# Patient Record
Sex: FEMALE | Race: WHITE
[De-identification: names, ages, dates, MRNs, and addresses within clinical notes are randomized per-mention and may not be internally consistent; named-entity substitution may affect disease eponyms.]

---

## 2020-01-30 ENCOUNTER — HOSPITAL ENCOUNTER (OUTPATIENT)
Dept: HOSPITAL 9 - MADCT | Age: 70
Discharge: HOME | End: 2020-01-30
Attending: FAMILY MEDICINE
Payer: MEDICARE

## 2020-01-30 DIAGNOSIS — G93.89: ICD-10-CM

## 2020-01-30 DIAGNOSIS — R22.0: Primary | ICD-10-CM

## 2020-01-30 DIAGNOSIS — L98.8: ICD-10-CM

## 2020-01-30 PROCEDURE — 70450 CT HEAD/BRAIN W/O DYE: CPT

## 2020-01-30 NOTE — CT
CT HEAD WITHOUT CONTRAST:

 

INDICATION: 

Bump on head. 

 

FINDINGS: 

There is a mass involving the scalp and subcutaneous tissues right posterior temporal region.  This m
ass measures approximately 4.0 AP dimension x approximately 1.0 cm width to the skull cortex.  This m
ass does involve the bony calvarium and is producing erosive changes in the skull at this location.  
There is also dural thickening at this site indicating dural involvement.  This is consistent with a 
neoplastic process.

 

There are 2 high-density masses involving the left cerebral hemisphere.  The larger measures 2.5 cm A
P dimension and is seen in the left posterior parietal lobe.  There is mild surrounding edema.  A sec
ond lesion measures 0.7 cm AP dimension and is located in the superior cortex of the left occipital l
obe.  Mild edema surrounds this lesion.  

 

Both these lesions are high-density on this noncontrast study.  This could indicate hemorrhagic metas
tasis or it could represent melanoma metastasis.

 

IMPRESSION: 

An aggressive scalp lesion in the right posterior temporoparietal region which produces skull erosion
 and dural involvement as described above.  There are at least 2 parenchymal lesions seen on the left
 as described above consistent with metastatic lesions.  Recommend further evaluation with pre- and p
ostcontrast MRI.  Findings were discussed with Dr. Taylor.

 

 

CODE CR

 

POS: AFSANEH

## 2020-02-01 ENCOUNTER — HOSPITAL ENCOUNTER (INPATIENT)
Dept: HOSPITAL 92 - ERS | Age: 70
LOS: 10 days | DRG: 871 | End: 2020-02-11
Attending: INTERNAL MEDICINE | Admitting: FAMILY MEDICINE
Payer: MEDICARE

## 2020-02-01 VITALS — BODY MASS INDEX: 21.5 KG/M2

## 2020-02-01 DIAGNOSIS — Z88.8: ICD-10-CM

## 2020-02-01 DIAGNOSIS — I50.23: ICD-10-CM

## 2020-02-01 DIAGNOSIS — J69.0: ICD-10-CM

## 2020-02-01 DIAGNOSIS — Z66: ICD-10-CM

## 2020-02-01 DIAGNOSIS — F10.10: ICD-10-CM

## 2020-02-01 DIAGNOSIS — E87.6: ICD-10-CM

## 2020-02-01 DIAGNOSIS — Z90.710: ICD-10-CM

## 2020-02-01 DIAGNOSIS — E87.5: ICD-10-CM

## 2020-02-01 DIAGNOSIS — F41.9: ICD-10-CM

## 2020-02-01 DIAGNOSIS — M40.209: ICD-10-CM

## 2020-02-01 DIAGNOSIS — R29.6: ICD-10-CM

## 2020-02-01 DIAGNOSIS — C79.31: ICD-10-CM

## 2020-02-01 DIAGNOSIS — J44.1: ICD-10-CM

## 2020-02-01 DIAGNOSIS — Z88.0: ICD-10-CM

## 2020-02-01 DIAGNOSIS — N39.0: ICD-10-CM

## 2020-02-01 DIAGNOSIS — D63.8: ICD-10-CM

## 2020-02-01 DIAGNOSIS — H40.9: ICD-10-CM

## 2020-02-01 DIAGNOSIS — E22.2: ICD-10-CM

## 2020-02-01 DIAGNOSIS — C79.70: ICD-10-CM

## 2020-02-01 DIAGNOSIS — R59.1: ICD-10-CM

## 2020-02-01 DIAGNOSIS — C34.11: ICD-10-CM

## 2020-02-01 DIAGNOSIS — E88.3: ICD-10-CM

## 2020-02-01 DIAGNOSIS — D69.6: ICD-10-CM

## 2020-02-01 DIAGNOSIS — Z51.5: ICD-10-CM

## 2020-02-01 DIAGNOSIS — D75.89: ICD-10-CM

## 2020-02-01 DIAGNOSIS — A41.9: Primary | ICD-10-CM

## 2020-02-01 DIAGNOSIS — E46: ICD-10-CM

## 2020-02-01 DIAGNOSIS — J96.21: ICD-10-CM

## 2020-02-01 DIAGNOSIS — C78.7: ICD-10-CM

## 2020-02-01 DIAGNOSIS — R62.7: ICD-10-CM

## 2020-02-01 DIAGNOSIS — S72.141A: ICD-10-CM

## 2020-02-01 LAB
ALBUMIN SERPL BCG-MCNC: 3.7 G/DL (ref 3.4–4.8)
ALP SERPL-CCNC: 96 U/L (ref 40–110)
ALT SERPL W P-5'-P-CCNC: 45 U/L (ref 8–55)
ANION GAP SERPL CALC-SCNC: 15 MMOL/L (ref 10–20)
ANION GAP SERPL CALC-SCNC: 16 MMOL/L (ref 10–20)
APTT PPP: 28.5 SEC (ref 22.9–36.1)
AST SERPL-CCNC: 39 U/L (ref 5–34)
BACTERIA UR QL AUTO: (no result) HPF
BASOPHILS # BLD AUTO: 0 THOU/UL (ref 0–0.2)
BASOPHILS NFR BLD AUTO: 0.1 % (ref 0–1)
BILIRUB SERPL-MCNC: 0.9 MG/DL (ref 0.2–1.2)
BUN SERPL-MCNC: 8 MG/DL (ref 9.8–20.1)
BUN SERPL-MCNC: 9 MG/DL (ref 9.8–20.1)
CALCIUM SERPL-MCNC: 7.9 MG/DL (ref 7.8–10.44)
CALCIUM SERPL-MCNC: 8.4 MG/DL (ref 7.8–10.44)
CHLORIDE SERPL-SCNC: 70 MMOL/L (ref 98–107)
CHLORIDE SERPL-SCNC: 74 MMOL/L (ref 98–107)
CK SERPL-CCNC: 136 U/L (ref 29–168)
CO2 SERPL-SCNC: 26 MMOL/L (ref 23–31)
CO2 SERPL-SCNC: 28 MMOL/L (ref 23–31)
CREAT CL PREDICTED SERPL C-G-VRATE: 0 ML/MIN (ref 70–130)
CREAT CL PREDICTED SERPL C-G-VRATE: 0 ML/MIN (ref 70–130)
D DIMER PPP FEU-MCNC: 4.25 *MCG/ML (ref 0.27–0.43)
EOSINOPHIL # BLD AUTO: 0 THOU/UL (ref 0–0.7)
EOSINOPHIL NFR BLD AUTO: 0.2 % (ref 0–10)
GLOBULIN SER CALC-MCNC: 1.9 G/DL (ref 2.4–3.5)
GLUCOSE SERPL-MCNC: 112 MG/DL (ref 80–115)
GLUCOSE SERPL-MCNC: 124 MG/DL (ref 80–115)
HGB BLD-MCNC: 13 G/DL (ref 12–16)
INR PPP: 1.2
LEUKOCYTE ESTERASE UR QL STRIP.AUTO: 75 LEU/UL
LIPASE SERPL-CCNC: 27 U/L (ref 8–78)
LYMPHOCYTES # BLD: 0.8 THOU/UL (ref 1.2–3.4)
LYMPHOCYTES NFR BLD AUTO: 5.9 % (ref 21–51)
MAGNESIUM SERPL-MCNC: 1.7 MG/DL (ref 1.6–2.6)
MCH RBC QN AUTO: 35.4 PG (ref 27–31)
MCV RBC AUTO: 98.3 FL (ref 78–98)
MONOCYTES # BLD AUTO: 0.8 THOU/UL (ref 0.11–0.59)
MONOCYTES NFR BLD AUTO: 5.9 % (ref 0–10)
NEUTROPHILS # BLD AUTO: 12 THOU/UL (ref 1.4–6.5)
NEUTROPHILS NFR BLD AUTO: 87.9 % (ref 42–75)
PLATELET # BLD AUTO: 277 THOU/UL (ref 130–400)
POTASSIUM SERPL-SCNC: 3 MMOL/L (ref 3.5–5.1)
POTASSIUM SERPL-SCNC: 3.1 MMOL/L (ref 3.5–5.1)
PROT UR STRIP.AUTO-MCNC: 20 MG/DL
PROTHROMBIN TIME: 15.1 SEC (ref 12–14.7)
RBC # BLD AUTO: 3.67 MILL/UL (ref 4.2–5.4)
RBC UR QL AUTO: (no result) HPF (ref 0–3)
SODIUM SERPL-SCNC: 111 MMOL/L (ref 136–145)
SODIUM SERPL-SCNC: 112 MMOL/L (ref 136–145)
WBC # BLD AUTO: 13.6 THOU/UL (ref 4.8–10.8)

## 2020-02-01 PROCEDURE — 84550 ASSAY OF BLOOD/URIC ACID: CPT

## 2020-02-01 PROCEDURE — 87804 INFLUENZA ASSAY W/OPTIC: CPT

## 2020-02-01 PROCEDURE — 85730 THROMBOPLASTIN TIME PARTIAL: CPT

## 2020-02-01 PROCEDURE — 88173 CYTOPATH EVAL FNA REPORT: CPT

## 2020-02-01 PROCEDURE — 82533 TOTAL CORTISOL: CPT

## 2020-02-01 PROCEDURE — 74175 CTA ABDOMEN W/CONTRAST: CPT

## 2020-02-01 PROCEDURE — 94660 CPAP INITIATION&MGMT: CPT

## 2020-02-01 PROCEDURE — 96361 HYDRATE IV INFUSION ADD-ON: CPT

## 2020-02-01 PROCEDURE — 88342 IMHCHEM/IMCYTCHM 1ST ANTB: CPT

## 2020-02-01 PROCEDURE — 83880 ASSAY OF NATRIURETIC PEPTIDE: CPT

## 2020-02-01 PROCEDURE — 94640 AIRWAY INHALATION TREATMENT: CPT

## 2020-02-01 PROCEDURE — 87040 BLOOD CULTURE FOR BACTERIA: CPT

## 2020-02-01 PROCEDURE — 93005 ELECTROCARDIOGRAM TRACING: CPT

## 2020-02-01 PROCEDURE — 82607 VITAMIN B-12: CPT

## 2020-02-01 PROCEDURE — 85379 FIBRIN DEGRADATION QUANT: CPT

## 2020-02-01 PROCEDURE — 82805 BLOOD GASES W/O2 SATURATION: CPT

## 2020-02-01 PROCEDURE — 80053 COMPREHEN METABOLIC PANEL: CPT

## 2020-02-01 PROCEDURE — 84100 ASSAY OF PHOSPHORUS: CPT

## 2020-02-01 PROCEDURE — 51702 INSERT TEMP BLADDER CATH: CPT

## 2020-02-01 PROCEDURE — 86850 RBC ANTIBODY SCREEN: CPT

## 2020-02-01 PROCEDURE — 5A09557 ASSISTANCE WITH RESPIRATORY VENTILATION, GREATER THAN 96 CONSECUTIVE HOURS, CONTINUOUS POSITIVE AIRWAY PRESSURE: ICD-10-PCS | Performed by: PHYSICIAN ASSISTANT

## 2020-02-01 PROCEDURE — 88360 TUMOR IMMUNOHISTOCHEM/MANUAL: CPT

## 2020-02-01 PROCEDURE — 83735 ASSAY OF MAGNESIUM: CPT

## 2020-02-01 PROCEDURE — 72170 X-RAY EXAM OF PELVIS: CPT

## 2020-02-01 PROCEDURE — 87086 URINE CULTURE/COLONY COUNT: CPT

## 2020-02-01 PROCEDURE — 84484 ASSAY OF TROPONIN QUANT: CPT

## 2020-02-01 PROCEDURE — 85610 PROTHROMBIN TIME: CPT

## 2020-02-01 PROCEDURE — 70450 CT HEAD/BRAIN W/O DYE: CPT

## 2020-02-01 PROCEDURE — P9047 ALBUMIN (HUMAN), 25%, 50ML: HCPCS

## 2020-02-01 PROCEDURE — 83935 ASSAY OF URINE OSMOLALITY: CPT

## 2020-02-01 PROCEDURE — 80048 BASIC METABOLIC PNL TOTAL CA: CPT

## 2020-02-01 PROCEDURE — 36415 COLL VENOUS BLD VENIPUNCTURE: CPT

## 2020-02-01 PROCEDURE — 82550 ASSAY OF CK (CPK): CPT

## 2020-02-01 PROCEDURE — 86901 BLOOD TYPING SEROLOGIC RH(D): CPT

## 2020-02-01 PROCEDURE — 71275 CT ANGIOGRAPHY CHEST: CPT

## 2020-02-01 PROCEDURE — 88172 CYTP DX EVAL FNA 1ST EA SITE: CPT

## 2020-02-01 PROCEDURE — 80069 RENAL FUNCTION PANEL: CPT

## 2020-02-01 PROCEDURE — 84443 ASSAY THYROID STIM HORMONE: CPT

## 2020-02-01 PROCEDURE — 96365 THER/PROPH/DIAG IV INF INIT: CPT

## 2020-02-01 PROCEDURE — 86900 BLOOD TYPING SEROLOGIC ABO: CPT

## 2020-02-01 PROCEDURE — 82746 ASSAY OF FOLIC ACID SERUM: CPT

## 2020-02-01 PROCEDURE — 85025 COMPLETE CBC W/AUTO DIFF WBC: CPT

## 2020-02-01 PROCEDURE — 96376 TX/PRO/DX INJ SAME DRUG ADON: CPT

## 2020-02-01 PROCEDURE — 83930 ASSAY OF BLOOD OSMOLALITY: CPT

## 2020-02-01 PROCEDURE — 83605 ASSAY OF LACTIC ACID: CPT

## 2020-02-01 PROCEDURE — 72191 CT ANGIOGRAPH PELV W/O&W/DYE: CPT

## 2020-02-01 PROCEDURE — 83690 ASSAY OF LIPASE: CPT

## 2020-02-01 PROCEDURE — 88341 IMHCHEM/IMCYTCHM EA ADD ANTB: CPT

## 2020-02-01 PROCEDURE — 81003 URINALYSIS AUTO W/O SCOPE: CPT

## 2020-02-01 PROCEDURE — 81015 MICROSCOPIC EXAM OF URINE: CPT

## 2020-02-01 PROCEDURE — 93970 EXTREMITY STUDY: CPT

## 2020-02-01 PROCEDURE — 71045 X-RAY EXAM CHEST 1 VIEW: CPT

## 2020-02-01 PROCEDURE — 88305 TISSUE EXAM BY PATHOLOGIST: CPT

## 2020-02-01 PROCEDURE — 88177 CYTP FNA EVAL EA ADDL: CPT

## 2020-02-01 PROCEDURE — 96375 TX/PRO/DX INJ NEW DRUG ADDON: CPT

## 2020-02-01 NOTE — RAD
Chest AP view



INDICATION: History of syncope



COMPARISON: None



FINDINGS:



Lungs:There is a 5.6 cm right suprahilar mass which may reflect aneurysmal dilatation of the ascendin
g aorta or possibly prominent lymphadenopathy or a hilar malignancy. There is some moderate COPD

change.



Cardiac silhouette:There is mild cardiomegaly. There are vascular calcifications of the aortic arch.



Pulmonary vasculature:Normal



Pleural spaces:No pleural effusion or pneumothorax is demonstrated.



Upper abdomen:No abnormality seen.



Osseous structures: No acute osseous abnormality. There is healed fracture deformity involving the pr
oximal left humerus. There is diffuse osteopenia. There is scattered degenerative and

osteoarthritic change present.



Additional findings:None.



IMPRESSION: Right suprahilar mass possibly related to an aneurysm, lymphadenopathy or hilar malignanc
y. Recommend CT of the thorax with IV contrast for additional characterization. Moderate COPD

change, mild cardiomegaly and healed deformity of the proximal left humerus.



Reported By: Chandu Dutton 

Electronically Signed:  2/1/2020 6:28 PM

## 2020-02-01 NOTE — CT
CT Brain WO Con:



2/1/2020 6:16 PM



CLINICAL HISTORY: Frequent falls.



IMAGING TECHNIQUE: Multiple CT images were obtained of the brain without IV contrast.



COMPARISON: CT the brain without contrast dated genera 30th 2020



FINDINGS:

Brain:  Large left parietal lobe metastatic lesion measuring 2.4 cm a stable appearing. There is a 7 
mm left occipital lesion which is similar appearing. The osteolytic lesion involving the right

parietal skull with associated scalp soft tissue mass is stable. No midline shift is evident. No acut
e intracranial hemorrhage or hydrocephalus is present.

Ventricles: Normal.  No hydrocephalus.

Skull: As above

Visualized Paranasal sinuses: Clear.

Mastoid air cells:Clear.

Extracranial soft tissues:Normal.



IMPRESSION:



Stable intracranial metastatic disease and right parietal skull metastatic lesion.

No acute interval intracranial abnormality demonstrated.



Reported By: Chandu Dutton 

Electronically Signed:  2/1/2020 7:05 PM

## 2020-02-01 NOTE — CT
CTA OF THE CHEST AND ABDOMEN UTILIZING AN AORTIC DISSECTION PROTOCOL AND 3-D REFORMATTED IMAGING



INDICATION: 69-year-old female with possible aneurysm or hilar mass



COMPARISON: Chest radiograph dated February 1, 2020



FINDINGS:



Aorta: No acute aortic stenosis, occlusion or aneurysmal formation demonstrated. There are mild vascu
lar calcifications seen involving the visualized vasculature.



Central pulmonary artery: No central pulmonary embolus demonstrated.



Additional thorax findings: There is a large 7.8 x 6.4 cm mass involving the right suprahilar region 
with invasion into the right upper lobe bronchus with circumferential narrowing involving the right

mainstem bronchus and SVC. There is suspected mucous debris seen at the level of the hossein. There is
 an enlarged right supraclavicular lymph node measuring 1.2 cm. There are pleural-based metastatic

lesions in the right hemithorax. When the largest seen within the posterior right hemithorax measures
 3 4 4 cm. An additional lesion is seen within posterior right hemithorax measuring 1.5 cm. There

is an enlarged anterior mediastinal lymph node measuring 3.7 x 2.2 cm. There is a small right pleural
 effusion.



Additional abdominal findings: Multiple hepatic metastatic lesions. The largest is seen within left h
epatic lobe measuring 5.1 cm. There is a 1.5 cm right adrenal metastatic lesion. There is a 1.1 cm

left adrenal metastatic lesion. There are enlarged lymph nodes within the peripancreatic and portacav
al regions consistent with malignant lymphadenopathy. When the largest measures 1.8 cm.

Osseous structures: There are age-indeterminate compression abnormalities involving T3, T4, T6-L4. Th
ere is healed fracture deformity involving the distal sternal body. There is diffuse osteopenia.

There is scattered degenerative and osteoarthritic change present.



IMPRESSION:

1. Large right suprahilar mass invading the right aspect of the mediastinum extending up into the sup
erior right paratracheal region and surrounding the SVC. The lesion also invades the right upper

lobe bronchus completely obstructing the right upper lobe bronchus. There is circumferential involvem
ent of the lesion with a right mainstem bronchus. There is malignant lymphadenopathy within the

right subclavicular region and anterior mediastinum. There is metastatic lesions within the right ple
ural space with a small right pleural effusion. There are multiple hepatic metastatic lesions and

bilateral adrenal metastatic lesions. There is malignant lymphadenopathy of the upper abdomen.

2. No acute aortic stenosis, occlusion or aneurysmal formation.

3. Numerous age-indeterminate thoracolumbar spinal compression abnormalities.



Reported By: Chandu Dutton 

Electronically Signed:  2/1/2020 7:02 PM

## 2020-02-01 NOTE — RAD
AP view of the pelvis



INDICATION: Fall



COMPARISON: None.



FINDINGS:



Bones: There is a comminuted right hip intertrochanteric fracture. There is diffuse osteopenia. No ad
ditional fracture is evident.



Hips: There is mild osteoarthrosis of both hips. 



SI joints and symphysis pubis: Normal appearing.



Intrapelvic contents: Within normal limits.



IMPRESSION: Comminuted, angulated right hip intertrochanteric fracture. 



Reported By: Chandu Dutton 

Electronically Signed:  2/1/2020 6:31 PM

## 2020-02-01 NOTE — RAD
XR Hip Rt 2-3 View



INDICATION: Fall with right hip pain



COMPARISON: None



FINDINGS:



Bones: There is a varus angulated, moderately displaced right hip intertrochanteric fracture. The fra
cture is obliquely oriented and extends through the right inferior intertrochanteric region and

exits just proximal to the origin of the lesser trochanter. The proximal fracture fragment is abducte
d likely from the gluteal musculature pulling the fragment laterally and superiorly. There is

diffuse osteopenia. No additional fractures evident.



Hip joint: There is mild right hip osteoarthrosis.



SI joints and symphysis pubis: Radiographically normal.



Intrapelvic contents: Visualized bowel gas pattern is within normal limits.



Surrounding soft tissues: Radiographically normal.



IMPRESSION:

1. Moderately displaced right hip intertrochanteric fracture.



Reported By: Chandu Dutton 

Electronically Signed:  2/1/2020 6:30 PM

## 2020-02-01 NOTE — HP
PRIMARY CARE PHYSICIAN:  Dr. Taylor.



CHIEF COMPLAINT:  Fall.



HISTORY OF PRESENT ILLNESS:  The patient is a 69-year-old female, who presented 
to

the emergency room with above complaints.  Please note, the patient is 
somnolent and

not much information is available from the patient.  History was obtained from 
the

ER record.  No family at the bedside. 



The patient has been feeling generally weak over the past few days.  She had 2

episodes of fall in the last 2 days.  She reports that she has a history of 
brain

cancer and has an appointment to see Dr. Toussaint next week.  No fever or 
chills

reported.  She denies any other complaints.  Again, she is somnolent and not 
much

information is available from the patient. 



PAST MEDICAL HISTORY:  

1. Glaucoma.

2. Questionable brain cancer.



PAST SURGICAL HISTORY:  

1. Hysterectomy.

2. Glaucoma.

3. Tonsillectomy.



ALLERGIES:  THE PATIENT IS ALLERGIC TO CODEINE AND PENICILLIN.



CURRENT HOME MEDICATIONS:  The patient states that she takes eyedrops for 
glaucoma

and other p.r.n. over-the-counter medications.  Per ER report, her  is 
giving

half of her water pill. 



SOCIAL HISTORY:  The patient currently lives at home with her .  She 
does not

use a cane or walker to ambulate.  She denies any smoking or alcohol. 



FAMILY HISTORY:  Negative for heart disease.



REVIEW OF SYSTEMS:  Cannot be reliably obtained from the patient due to current

cognitive status. 



PHYSICAL EXAMINATION:

VITAL SIGNS:  Temperature 98.1, respirations 22, pulse rate of 99, blood 
pressure of

123/77 with O2 saturations of 92% on room air. 

GENERAL:  A 69-year-old female with altered mentation. 

HEENT:  Head, atraumatic and normocephalic.  Sclerae anicteric.  Dry mucous

membranes.  No oral lesion. 

NECK:  Supple.  No JVD appreciated.  No carotid bruit. 

LUNGS:  Showed diminished air entry at bilateral bases without any rales or 
rhonchi. 

HEART:  S1, S2 present.  Regular rate and rhythm.  No rubs or gallops. 

ABDOMEN:  Soft, nontender.  Bowel sounds present. 

EXTREMITIES:  There is 3+ edema in bilateral lower extremity.  The right leg is

shortened and externally rotated. 

NEUROLOGIC:  The patient is somnolent; however, opens eyes and follows commands 
to

some extent.  There is no overall rigidity.  There is generalized weakness.

Examination was essentially nonfocal. 

PSYCHIATRIC:  As discussed above. 

SKIN:  Warm and dry. 

LYMPH NODES:  No palpable lymph nodes in the neck. 

PERIPHERAL VASCULAR:  Radial pulses palpable bilaterally. 

MUSCULOSKELETAL:  No joint swelling tenderness except for swelling at the right 
hip.



LABORATORY FINDINGS:  WBC 13.6 with hemoglobin 13, hematocrit 36, platelet 
count of

277. 



PT 15.1, INR 1.2, PTT 28.2.  D-dimer was 4.25. 



Chemistry showed sodium 111, potassium 3.1, chloride of 70, bicarb 28, BUN 9,

creatinine 0.49, glucose of 124. 



Lactic acid 1.1.  Serum osmolality 231.  Troponin was negative.  BNP was 28.2.

Total bilirubin 0.9 with AST of 39. 



Urine osmolality was 425.  Urine specific gravity was 1.05 with wbc of 11 to 20 
and

3+ bacteria. 



Influenza screen was negative. 



Urine culture and blood cultures have been sent. 



Telemetry monitoring by my review showed sinus rhythm. 



CT aortic dissection protocol showed a large 7.8 x 6.4 cm mass involving the 
right

suprahilar region with invasion into the right upper lobe bronchus with

circumferential narrowing involving the right mainstem bronchus and superior 
vena

cava.  There was also multiple hepatic metastatic lesion as well as bilateral

adrenal metastatic lesion with malignant lymphadenopathy in the upper abdomen. 



Chest x-ray by my review showed right suprahilar mass. 



Right hip x-ray showed moderately displaced right hip intertrochanteric 
fracture. 



CT scan of the brain without contrast showed stable intracranial metastatic 
disease

and right parietal skull metastatic lesion. 



IMPRESSION:  

1. Generalized weakness with frequent falls, multifactorial.

2. Severe hypotonic hyponatremia, suspected due to syndrome of inappropriate

antidiuretic hormone secretion. 

3. Large right suprahilar mass with metastasis involving the liver, adrenal 
gland,

as well as brain. 

4. Moderately displaced right hip intertrochanteric fracture.

5. Sepsis due to urinary tract infection.

6. Hypokalemia.

7. Macrocytosis.



PLAN:  The patient will be monitored in the intermediate care unit.  Nephrology,

Orthopedic Service, Trauma Service, and Oncology Service will be consulted.  We 
will

replace potassium.  We will hold IV fluids due to SIADH.  I discussed with Dr. Jean,

who recommended to check electrolytes directly in a.m.  We will also add empiric

antibiotics for UTI.  She is allergic to penicillin.  She is currently on 
Levaquin.

Magnesium was 1.7 with phosphorus of 3.0.  We will administer 2 g magnesium.  We

will hold Physical therapy and Occupational therapy for now due to hip 
fracture. 



The patient understands the above plan of care.







Job ID:  [id number]



MTDD

## 2020-02-02 LAB
ALBUMIN SERPL BCG-MCNC: 3.9 G/DL (ref 3.4–4.8)
ALBUMIN SERPL BCG-MCNC: 3.9 G/DL (ref 3.4–4.8)
ANALYZER IN CARDIO: (no result)
ANION GAP SERPL CALC-SCNC: 12 MMOL/L (ref 10–20)
ANION GAP SERPL CALC-SCNC: 13 MMOL/L (ref 10–20)
ANION GAP SERPL CALC-SCNC: 15 MMOL/L (ref 10–20)
BASE EXCESS STD BLDA CALC-SCNC: 4.5 MEQ/L
BASOPHILS # BLD AUTO: 0.1 THOU/UL (ref 0–0.2)
BASOPHILS NFR BLD AUTO: 0.5 % (ref 0–1)
BUN SERPL-MCNC: 5 MG/DL (ref 9.8–20.1)
BUN SERPL-MCNC: 6 MG/DL (ref 9.8–20.1)
BUN SERPL-MCNC: 7 MG/DL (ref 9.8–20.1)
BUN/CREAT SERPL: 13.64
BUN/CREAT SERPL: 14.89
CA-I BLDA-SCNC: 1.12 MMOL/L (ref 1.12–1.3)
CALCIUM SERPL-MCNC: 8 MG/DL (ref 7.8–10.44)
CALCIUM SERPL-MCNC: 8.1 MG/DL (ref 7.8–10.44)
CALCIUM SERPL-MCNC: 8.3 MG/DL (ref 7.8–10.44)
CHLORIDE SERPL-SCNC: 75 MMOL/L (ref 98–107)
CHLORIDE SERPL-SCNC: 76 MMOL/L (ref 98–107)
CHLORIDE SERPL-SCNC: 79 MMOL/L (ref 98–107)
CO2 SERPL-SCNC: 27 MMOL/L (ref 23–31)
CO2 SERPL-SCNC: 30 MMOL/L (ref 23–31)
CO2 SERPL-SCNC: 32 MMOL/L (ref 23–31)
CREAT CL PREDICTED SERPL C-G-VRATE: 88 ML/MIN (ref 70–130)
CREAT CL PREDICTED SERPL C-G-VRATE: 92 ML/MIN (ref 70–130)
CREAT CL PREDICTED SERPL C-G-VRATE: 92 ML/MIN (ref 70–130)
EOSINOPHIL # BLD AUTO: 0 THOU/UL (ref 0–0.7)
EOSINOPHIL NFR BLD AUTO: 0.1 % (ref 0–10)
GLUCOSE SERPL-MCNC: 112 MG/DL (ref 80–115)
GLUCOSE SERPL-MCNC: 127 MG/DL (ref 80–115)
GLUCOSE SERPL-MCNC: 139 MG/DL (ref 80–115)
HCO3 BLDA-SCNC: 30.9 MEQ/L (ref 22–28)
HCT VFR BLDA CALC: 33 % (ref 36–47)
HGB BLD-MCNC: 10 G/DL (ref 12–16)
HGB BLD-MCNC: 10.6 G/DL (ref 12–16)
HGB BLD-MCNC: 11.2 G/DL (ref 12–16)
HGB BLDA-MCNC: 11.1 G/DL (ref 12–16)
LYMPHOCYTES # BLD: 0.5 THOU/UL (ref 1.2–3.4)
LYMPHOCYTES NFR BLD AUTO: 4.1 % (ref 21–51)
MCH RBC QN AUTO: 34.7 PG (ref 27–31)
MCV RBC AUTO: 98.6 FL (ref 78–98)
MONOCYTES # BLD AUTO: 0.8 THOU/UL (ref 0.11–0.59)
MONOCYTES NFR BLD AUTO: 6.5 % (ref 0–10)
NEUTROPHILS # BLD AUTO: 11.2 THOU/UL (ref 1.4–6.5)
NEUTROPHILS NFR BLD AUTO: 88.8 % (ref 42–75)
PCO2 BLDA: 55 MMHG (ref 35–45)
PH BLDA: 7.37 [PH] (ref 7.35–7.45)
PLATELET # BLD AUTO: 230 THOU/UL (ref 130–400)
PO2 BLDA: 45.2 MMHG (ref 80–?)
POTASSIUM BLD-SCNC: 3.44 MMOL/L (ref 3.7–5.3)
POTASSIUM SERPL-SCNC: 3.4 MMOL/L (ref 3.5–5.1)
POTASSIUM SERPL-SCNC: 3.6 MMOL/L (ref 3.5–5.1)
POTASSIUM SERPL-SCNC: 3.7 MMOL/L (ref 3.5–5.1)
RBC # BLD AUTO: 2.88 MILL/UL (ref 4.2–5.4)
SODIUM SERPL-SCNC: 114 MMOL/L (ref 136–145)
SODIUM SERPL-SCNC: 114 MMOL/L (ref 136–145)
SODIUM SERPL-SCNC: 120 MMOL/L (ref 136–145)
SPECIMEN DRAWN FROM PATIENT: (no result)
WBC # BLD AUTO: 12.6 THOU/UL (ref 4.8–10.8)

## 2020-02-02 RX ADMIN — METRONIDAZOLE SCH MLS: 500 INJECTION, SOLUTION INTRAVENOUS at 19:50

## 2020-02-02 NOTE — PDOC.HOSPP
- Subjective


Encounter Date: 02/02/20


Encounter Time: 12:00


Subjective: 





awake, responds to questions, is lethargic.


moves all extremities except right LE, has pain.


sob+





- Objective


Vital Signs & Weight: 


 Vital Signs (12 hours)











  Temp Pulse Resp Pulse Ox


 


 02/02/20 15:55  97.6 F   


 


 02/02/20 15:10   97  


 


 02/02/20 12:45   97  32 H 


 


 02/02/20 12:00     92 L


 


 02/02/20 11:31  97.6 F   


 


 02/02/20 07:39     98


 


 02/02/20 07:38  97.1 F L   


 


 02/02/20 07:31     98








 Weight











Weight                         106 lb 8 oz











 Most Recent Monitor Data











Heart Rate from ECG            92


 


NIBP                           108/68


 


NIBP BP-Mean                   81


 


Respiration from ECG           15


 


SpO2                           93














I&O: 


 











 02/01/20 02/02/20 02/03/20





 06:59 06:59 06:59


 


Intake Total  1250 


 


Output Total  950 


 


Balance  300 











Result Diagrams: 


 02/02/20 12:51





 02/02/20 12:51





Hospitalist ROS





- Medication


Medications: 


Active Medications











Generic Name Dose Route Start Last Admin





  Trade Name Freq  PRN Reason Stop Dose Admin


 


Albuterol/Ipratropium  3 ml  02/02/20 13:00  02/02/20 13:54





  Duoneb  NEB   Not Given





  H5NT-VO TI   





     





     





     





     


 


Famotidine  20 mg  02/02/20 09:00  02/02/20 08:08





  Pepcid  PO   20 mg





  BID TI   Administration





     





     





     





     


 


Magnesium Oxide  400 mg  02/02/20 09:00  02/02/20 08:08





  Magnesium Oxide  PO   400 mg





  BID TI   Administration





     





     





     





     


 


Saccharomyces Boulardii  250 mg  02/02/20 09:00  02/02/20 08:08





  Florastor  PO   250 mg





  DAILY TI   Administration





     





     





     





     














- Exam


General Appearance: ill appearing


Eye: PERRL, anicteric sclera


ENT: no oropharyngeal lesions, dry oral mucosa


Neck: supple, no JVD


Heart: RRR, no murmur


Respiratory: rhonchi, wheezes


Gastrointestinal: soft, non-tender, non-distended, normal bowel sounds


Extremities: no edema


Extremities - other findings: right lower extre is shortened and in ext 

rotation with flexion at knees


Neurological: cranial nerve grossly intact, no focal deficits





Hosp A/P


(1) Metastatic cancer


Code(s): C79.9 - SECONDARY MALIGNANT NEOPLASM OF UNSPECIFIED SITE   Status: 

Acute   





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


Status: Acute   





(3) Right femoral fracture


Code(s): S72.91XA - UNSP FRACTURE OF RIGHT FEMUR, INIT FOR CLOS FX   Status: 

Acute   


Qualifiers: 


   Encounter type: subsequent encounter   Femur location: intertrochanteric   

Fracture type: closed   Fracture alignment: displaced 





(4) Lung mass


Code(s): R91.8 - OTHER NONSPECIFIC ABNORMAL FINDING OF LUNG FIELD   Status: 

Acute   





(5) multiple liver mets


Status: Acute   





(6) Chronic anemia


Code(s): D64.9 - ANEMIA, UNSPECIFIED   Status: Chronic   





(7) Tobacco abuse


Code(s): Z72.0 - TOBACCO USE   Status: Chronic   





(8) Alcohol use disorder, mild, abuse


Code(s): F10.10 - ALCOHOL ABUSE, UNCOMPLICATED   Status: Chronic   





(9) FTT (failure to thrive) in adult


Status: Acute   





- Plan





is on fluid restriction for siadh, d/w , suggest hypertonic saline if she 

is planned to go to OR for hip fixation


poor prognosis with multiple mets from likely lung primary to occipital lobe, 

parietal skull, multiple large liver mets, adrenal glands and abd lymph nodes 

as well.


nebs, steroids to perk her up a bit with hypoxia now


morphine prn for hip fracture


d/w  extensively, pt is DNAR, he is aware of poor prognosis, their 

children will be arriving to see her.


is at risk of resp failure and ending up on vent if she goes to OR, suggest 

spinal if one can be done with acceptable high risk gray and post op to 

alleviate severe pain.


d/w , he will talk to  and anesthesia.


Palliative care consultation.


If patient and family want to proceed with biopsy, suggest parietal bone/liver 

under local anesthesia.

## 2020-02-02 NOTE — CON
DATE OF CONSULTATION:  



REQUESTING PHYSICIAN:  Lupillo Godfrey MD



BRIEF HISTORY OF PRESENT ILLNESS:  The patient is a 69-year-old lady, who was

examined in the emergency room at [facility] following a fall.  She reports that

over the last few days, she has been feeling generally weak and has had at least two

falls over the last two days.  On this most recent fall, she sustained trauma to the

right hip and as such, presents now to the emergency room for evaluation.  The

patient also reports that she has recently been diagnosed with a brain tumor and did

have an appointment to see Dr. Toussaint this coming week for evaluation of this

problem.  Upon evaluation in the emergency room, x-rays of the right hip and pelvis

were obtained.  These are remarkable for an intertrochanteric femur fracture with

reverse obliquity and displacement.  I do not appreciate obvious pathologic fracture

evidences.  There is no obvious soft tissue lesions or lytic lesions within the bone

within the limits of this study.  The patient also had a brain CT and chest x-ray as

well as chest CT while in the emergency room and these studies have revealed

evidence of a parietal lobe tumor, which by radiologist's report is stated to be

"stable.  The chest x-ray and chest CT shows a large right suprahilar mass invading

the mediastinum and right upper lobe bronchus as well as evidence of malignant

lymphadenopathy.  Given this constellation of problems as well as a sodium of 111,

the patient admitted to the Medical Service and orthopedic consultation requested. 



PAST MEDICAL HISTORY:  That is known is that of a recent diagnosis of brain tumor

and history of glaucoma. 



PAST SURGICAL HISTORY:  Includes surgery for this glaucoma, as well as tonsillectomy

and hysterectomy. 



MEDICATIONS:  The patient states that she does use eyedrops for glaucoma as well as

occasional over-the-counter medications for pain. 



ALLERGIES:  INCLUDE CODEINE AND PENICILLIN.



SOCIAL HISTORY:  She lives with her  in a private home.  She does use a

walker to ambulate and has had recent falls.  She denies smoking or alcohol history. 



FAMILY HISTORY:  Noncontributory for her fracture.



REVIEW OF SYSTEMS:  Difficult to obtain from the patient due to her current state of

cognition.  She is arousable, but really is not capable of giving an in-depth

review.  She does not report any recent fevers or chills.  She does report some

cough and mild chest pain. 



PHYSICAL EXAMINATION:

VITAL SIGNS:  Temperature of 98.1, heart rate of 99, respiratory rate of 22, and

blood pressure 123/77. 

GENERAL:  The patient is found to be a 69-year-old lady, who is lying supine with

obvious discomfort in the right groin region.  She will open her eyes and is able to

provide some simple yes or no answers to questions, but otherwise seems somewhat

somnolent. 

HEENT:  Atraumatic and normocephalic. 

HEART:  Shows a regular rate and rhythm without murmur. 

LUNGS:  Remarkable for diminished breath sounds, most noticeable on the right side.

Chest wall is nontender. 

ABDOMEN:  Flat and with normal bowel sounds. 

PELVIS:  Stable to compression. 

EXTREMITIES:  Remarkable for bilateral upper extremities with no gross deformity.

She is able to both actively and passively move at the shoulder, elbow, wrist, and

hand without obvious pain or deformity.  The left lower extremity also without

obvious deformity.  The right lower extremity remarkable for pain at the proximal

femur and groin region with any type of motion of this right leg.  With log-rolling

of the thigh, she has pain in the groin.  The knee, ankle, and foot do appear

atraumatic.  She is able to gently wiggle her toes.  Denies numbness in the foot. 



LABORATORY DATA:  She was found to have a white count of 13.6, a hematocrit of 36.0,

and 277,000 platelets.  She has an INR of 1.2.  Upon admission, she had a sodium of

111, a potassium of 3.1, and a chloride of 70. 



IMAGING STUDIES:  X-rays; AP pelvis and two view x-ray of her right hip remarkable

for a fracture in the intertrochanteric region of the hip with reverse obliquity of

this fracture, as well as displacement. 



ASSESSMENT:  A 69-year-old lady with recent diagnosis of brain tumor and now on

workup studies consistent with a very large perihilar mass and a right

intertrochanteric femur fracture. 



PLAN:  Today, I briefly discussed with the patient and her  that from the

standpoint of her hip fracture, we would like to proceed with open reduction and

internal fixation to stabilize the bone and hopefully provide pain relief.

Obviously, there are multiple medical problems that have to be addressed first.  At

this time, I believe the patient is going to be admitted to the medical service due

to this recent diagnosis of brain tumor, the new diagnosis of this perihilar mass,

as well as her obvious abnormal labs findings.  As soon as they feel the patient is

an acceptable candidate for surgery, we will again discuss with her the risks and

benefits of stabilization of this fracture.  She appears comfortable with our

current discussion. 







Job ID:  [id number]

## 2020-02-02 NOTE — CON
DATE OF CONSULTATION:  



REASON FOR CONSULTATION:  Metastatic cancer.



HISTORY OF PRESENT ILLNESS:  A 69-year-old  female with tobacco and alcohol

abuse, brought to the ER by ambulance after a fall.  The patient states she was

walking and fell down, says she was not dizzy or lightheaded and did not trip, is

not sure why she fell.  She was found to have a right hip fracture on arrival to the

ER.  The patient states prior to 2 weeks ago, she felt at her baseline and generally

healthy without much complaints and then started feeling weak and had a couple

falls, but did not injure herself until this last one.  She denies any headaches,

vision changes, dizziness, lightheadedness, shortness of breath, cough, or weight

loss prior to the last 2 weeks.  She now complains of shortness of breath and

wheezing within the last few days.  Denies any pain other than hip pain.  She states

she only smoked half pack of cigarettes per day for approximately 20 years and does

not inhale; however, her  says she smokes a lot more than that for a lot

longer.  She states she drinks 3 to 4 liquor, gin beverages per day for years.  She

does not go to doctor routinely, but recently started seeing Dr. Taylor.  She had a

consult with Dr. Toussaint next week for suspected brain cancer.  Upon arrival to

the ER, she had a CT aortic dissection protocol, that showed a 7.8 x 6.4 cm mass in

the right suprahilar region invading into the right upper lobe bronchus, involving

SVC with a large right supraclavicular lymph node, pleural-based metastatic lesions,

mediastinal lymphadenopathy, liver metastases, adrenal metastases, and abdominal

lymphadenopathy. 



REVIEW OF SYSTEMS:  Ten-point review of systems negative except as per HPI.



PAST MEDICAL HISTORY:  

1. Glaucoma.

2. Tobacco abuse.

3. Alcohol abuse.



PAST SURGICAL HISTORY:  

1. Hysterectomy.

2. Tonsillectomy.



ALLERGIES:  CODEINE AND PENICILLIN.



CURRENT MEDICATIONS:  Eye drops for glaucoma.



SOCIAL HISTORY:  Lives with her .  Extensive smoking history.  3-4 gin

alcoholic beverages per day for years. 



FAMILY HISTORY:  Laryngeal cancer in her sister and liver cancer in her father.



PHYSICAL EXAMINATION:

VITAL SIGNS:  Temperature 97.6, pulse 93, saturating 79% to 94% on 5 L by nasal

cannula. 

GENERAL APPEARANCE:  The patient appears acutely ill and cachectic. 

HEENT:  Normocephalic and atraumatic.  Dry mucous membranes. 

NECK:  Supple.  No palpable lymphadenopathy. 

LUNGS:  Diffuse rhonchorous breath sounds and wheezes over all lung fields. 

CARDIAC:  S1 and S2.  Regular rhythm and rate. 

ABDOMEN:  Soft, nondistended, and nontender. 

EXTREMITIES:  3+ edema in bilateral lower extremities.  Unable to move right leg due

to fracture. 

NEUROLOGIC:  Cranial nerves 2 through 12 are grossly intact.  The patient is able to

move all extremities except the right lower extremity. 

SKIN:  No rash.



LABORATORY DATA:  White blood cells 12.6, hemoglobin 10, and platelets 230.  D-dimer

4.25.  Sodium 111 on admission and currently 114, potassium 3.7, BUN 7, and

creatinine 0.47.  Serum osm is 231. 



IMAGING DATA:  As per HPI.



ASSESSMENT AND PLAN:  A 69-year-old  female with extensive tobacco abuse,

presenting with fall and hip fracture, found to have a large likely primary lung

mass with brain, liver, adrenal gland, and diffuse lymphadenopathy metastatic

disease.  Given the patient's acute decline within only 2 weeks's time, most likely

diagnosis is small-cell lung cancer, which would fit with her syndrome of

inappropriate antidiuretic hormone as well.  If her breathing status can be somewhat

stabilized, then I would recommend a liver biopsy via Interventional Radiology to

establish a diagnosis, and if small cell is confirmed, then she would be a candidate

for inpatient chemotherapy.  If biopsy shows any other type of lung cancer, then

would likely at that time recommend hospice as the response with those is not very

good and the treatment would likely be even more detrimental to her than the disease

itself.  Small cell carcinoma causes a quick decline in performance status, and even

when extremely ill, chemotherapy can have a quick response, which may actually give

her clinical improvement.  We will await Pulmonary's input and further

recommendations are dependent on the clinical course.  Regarding brain, if this is

small cell lung cancer, then would watch the brain mets on chemo.  She is currently

not terribly symptomatic from it. 







Job ID:  [id number]

## 2020-02-02 NOTE — CON
DATE OF CONSULTATION:  2020



SERVICE:  Pulmonary Medicine.



REASON FOR CONSULTATION:  ICU patient.



HISTORY OF PRESENT ILLNESS:  The patient is a 69-year-old white female with past

medical history significant for a scalp lesion that has been investigated in the

outpatient setting.  Ultimately, over the last 2 to 3 weeks, she has had a very

pronounced and rapid decline in function.  She is having increasing episodes of

fall.  On one of these falls, she ended up fracturing her hip.  She was 
subsequently

brought to the emergency department.  A CT of the head demonstrated masses.  CT 
of

the chest demonstrated a mass.  There is some significant mediastinal

lymphadenopathy.  She also had scalp growth.  She was discovered to be 
profoundly

hypoxemic.  She was started on oxygen, which was escalated through the day to

Ventimask, non-rebreather and subsequent BiPAP.  She is currently on 100% FiO2.
  She

is somnolent, but is alert.  She denies any current chest discomfort, nausea,

vomiting, fevers, or chills.  Her pain is under good control. 



PAST MEDICAL HISTORY:  

1. Glaucoma.

2. Tobacco abuse.

3. Widely metastatic process.



PAST SURGICAL HISTORY:  

1. Hysterectomy.

2. Tonsillectomy.



ALLERGIES:  CODEINE, PENICILLIN.



MEDICATIONS:  List of her inpatient medications was reviewed.  No specific 
updates

were made at this time. 



SOCIAL HISTORY:  She was fully independent up until 2 weeks ago.  This was 
perhaps

in a quite robust fashion.  There are no reports of significant alcohol or 
illicit

drug use.  She is a smoker. 



FAMILY HISTORY:  Noncontributory.



REVIEW OF SYSTEMS:  This cannot be obtained because the patient is somnolent.



PHYSICAL EXAMINATION:

VITAL SIGNS:  Afebrile, pulse 92, blood pressure 108/68, respirations 15, and

saturation 93% currently on 100% FiO2 with a PEEP of 7. 

GENERAL:  The patient is somnolent, but does wake up and follow commands and

understands, nods yes and no appropriately. 

HEENT:  Normocephalic and atraumatic.  Sclerae white.  Conjunctivae pink.  Oral

mucosa is dry.  There are no lesions. 

LUNGS:  Good air entry on the left.  Decreased air entry on the right.  No 
prolonged

expiratory phase or crackles are appreciated. 

HEART:  Normal rate.  Regular. 

ABDOMEN:  Soft, nontender, and nondistended.  Bowel sounds are positive. 

MUSCULOSKELETAL:  No cyanosis or clubbing.  There is diffuse 2+ pitting 
throughout

bilateral lower extremities. 



LABORATORY DATA:  WBC 12.6, hemoglobin 10.6, and platelets 230,000.  INR 1.1.  
A pH

7.37, pCO2 of 55, pO2 of 45.  Sodium 114 and gently uptrending, potassium 3.6.

Basic metabolic profile is otherwise unremarkable.  Phosphorus 2.8.  Albumin 3.9
,

vitamin B12 and folate are unremarkable.  Cortisol 29.  Liver function studies 
were

unremarkable on presentation.  Urinalysis is negative.  Influenza A and B, blood

culture x2, and urine culture are negative to date. 



IMAGIN. Hip x-ray demonstrates a right hip intertrochanteric fracture.  This is

moderately displaced. 

2. CT of the brain demonstrates multiple metastatic lesions, the largest 
measuring

2.4 cm. 

3. CT dissection protocol demonstrates widespread lymphadenopathy as well as a

pulmonary mass.  Multiple liver lesions are also noted.  Bilateral small pleural

effusions are present with interstitial fullness throughout bilateral lung 
fields. 

4. Ultrasound of the bilateral lower extremities demonstrates no evidence for 
DVT.



ASSESSMENT:  

1. Acute hypoxic respiratory failure.

2. Hyponatremia, possibly as a result of syndrome of inappropriate antidiuretic

hormone secretion. 

3. Widely metastatic process, including pulmonary mass, mediastinal 
lymphadenopathy,

liver lesions, brain lesions, and scalp lesion. 

4. Mechanical fall secondary to medical issues resulting in intertrochanteric 
hip

fracture. 



DISCUSSION AND PLAN:  I will replace the potassium.  Surgical consultation will 
be

placed.  She is on so much oxygen at this point that I cannot remove the BiPAP.
  If

we intubated her for bronchoscopy, we will never get her extubated.  Liver 
lesion is

not feasible currently because she cannot leave the IMCU.  There is a scalp 
lesion

here.  Surgical consultation will be placed to get a biopsy at bedside if 
possible.

Hopefully, rapid on-site pathology can be available, so that we can expedite a

workup and initiate chemotherapy.  If this is small cell, she may be exquisitely

sensitive to it and have a robust recovery for 6 months to 2 years.  That being

said, she is critically ill at this point, remains a very firm DNAR.  As such,

intubation is not an option electively currently. 



70 minutes have been devoted to this patient in various activities.  I 
personally reviewed all imaging studies and laboratory data noted within this 
document.  For fifty percent of this time, I was interacting with the patient 
at the bedside or coordinating care with the care team.  For the remainder of 
the time I was immediately available to the patient in the hospital unit.  



Job ID:  [id number]



MTDD

## 2020-02-02 NOTE — ULT
BILATERAL LOWER EXTREMITY VENOUS DUPLEX ULTRASOUND INCLUDING COLOR AND SPECTRAL DOPPLER IMAGING:

 

HISTORY:

Metastatic cancer. Bilateral leg edema.

 

TECHNIQUE:

Exam performed from groin to ankle, including the greater saphenous, the common femoral, the superfic
ial femoral, the profunda femoral, the popliteal, the trifurcation and the posterior tibial vein allison
ons.

 

FINDINGS:

There is phasic flow at all levels. Normal compressibility and augmentation. No intraluminal thrombus
.

 

IMPRESSION: 

No evidence for deep venous thrombosis.

 

POS: [facility]IS[facility]EL

## 2020-02-03 LAB
ALBUMIN SERPL BCG-MCNC: 3.5 G/DL (ref 3.4–4.8)
ANION GAP SERPL CALC-SCNC: 10 MMOL/L (ref 10–20)
ANION GAP SERPL CALC-SCNC: 11 MMOL/L (ref 10–20)
ANION GAP SERPL CALC-SCNC: 11 MMOL/L (ref 10–20)
ANION GAP SERPL CALC-SCNC: 13 MMOL/L (ref 10–20)
BASOPHILS # BLD AUTO: 0 THOU/UL (ref 0–0.2)
BASOPHILS NFR BLD AUTO: 0 % (ref 0–1)
BUN SERPL-MCNC: 10 MG/DL (ref 9.8–20.1)
BUN SERPL-MCNC: 5 MG/DL (ref 9.8–20.1)
BUN SERPL-MCNC: 6 MG/DL (ref 9.8–20.1)
BUN SERPL-MCNC: 9 MG/DL (ref 9.8–20.1)
BUN/CREAT SERPL: 13.95
CALCIUM SERPL-MCNC: 8.3 MG/DL (ref 7.8–10.44)
CALCIUM SERPL-MCNC: 8.4 MG/DL (ref 7.8–10.44)
CALCIUM SERPL-MCNC: 8.9 MG/DL (ref 7.8–10.44)
CALCIUM SERPL-MCNC: 9.2 MG/DL (ref 7.8–10.44)
CHLORIDE SERPL-SCNC: 81 MMOL/L (ref 98–107)
CHLORIDE SERPL-SCNC: 82 MMOL/L (ref 98–107)
CHLORIDE SERPL-SCNC: 82 MMOL/L (ref 98–107)
CHLORIDE SERPL-SCNC: 86 MMOL/L (ref 98–107)
CO2 SERPL-SCNC: 27 MMOL/L (ref 23–31)
CO2 SERPL-SCNC: 29 MMOL/L (ref 23–31)
CO2 SERPL-SCNC: 33 MMOL/L (ref 23–31)
CO2 SERPL-SCNC: 36 MMOL/L (ref 23–31)
CREAT CL PREDICTED SERPL C-G-VRATE: 72 ML/MIN (ref 70–130)
CREAT CL PREDICTED SERPL C-G-VRATE: 80 ML/MIN (ref 70–130)
CREAT CL PREDICTED SERPL C-G-VRATE: 91 ML/MIN (ref 70–130)
CREAT CL PREDICTED SERPL C-G-VRATE: 94 ML/MIN (ref 70–130)
EOSINOPHIL # BLD AUTO: 0 THOU/UL (ref 0–0.7)
EOSINOPHIL NFR BLD AUTO: 0 % (ref 0–10)
GLUCOSE SERPL-MCNC: 141 MG/DL (ref 80–115)
GLUCOSE SERPL-MCNC: 155 MG/DL (ref 80–115)
GLUCOSE SERPL-MCNC: 202 MG/DL (ref 80–115)
GLUCOSE SERPL-MCNC: 228 MG/DL (ref 80–115)
HGB BLD-MCNC: 10.1 G/DL (ref 12–16)
LYMPHOCYTES # BLD: 0.5 THOU/UL (ref 1.2–3.4)
LYMPHOCYTES NFR BLD AUTO: 3 % (ref 21–51)
MCH RBC QN AUTO: 34.2 PG (ref 27–31)
MCV RBC AUTO: 99.6 FL (ref 78–98)
MONOCYTES # BLD AUTO: 0.6 THOU/UL (ref 0.11–0.59)
MONOCYTES NFR BLD AUTO: 3.5 % (ref 0–10)
NEUTROPHILS # BLD AUTO: 15.3 THOU/UL (ref 1.4–6.5)
NEUTROPHILS NFR BLD AUTO: 93.4 % (ref 42–75)
PLATELET # BLD AUTO: 226 THOU/UL (ref 130–400)
POTASSIUM SERPL-SCNC: 4.1 MMOL/L (ref 3.5–5.1)
POTASSIUM SERPL-SCNC: 4.2 MMOL/L (ref 3.5–5.1)
POTASSIUM SERPL-SCNC: 4.3 MMOL/L (ref 3.5–5.1)
POTASSIUM SERPL-SCNC: 4.6 MMOL/L (ref 3.5–5.1)
RBC # BLD AUTO: 2.96 MILL/UL (ref 4.2–5.4)
SODIUM SERPL-SCNC: 116 MMOL/L (ref 136–145)
SODIUM SERPL-SCNC: 118 MMOL/L (ref 136–145)
SODIUM SERPL-SCNC: 122 MMOL/L (ref 136–145)
SODIUM SERPL-SCNC: 128 MMOL/L (ref 136–145)
WBC # BLD AUTO: 16.3 THOU/UL (ref 4.8–10.8)

## 2020-02-03 PROCEDURE — 0HC0XZZ EXTIRPATION OF MATTER FROM SCALP SKIN, EXTERNAL APPROACH: ICD-10-PCS | Performed by: PHYSICIAN ASSISTANT

## 2020-02-03 RX ADMIN — Medication PRN ML: at 21:31

## 2020-02-03 RX ADMIN — METRONIDAZOLE SCH MLS: 500 INJECTION, SOLUTION INTRAVENOUS at 03:16

## 2020-02-03 RX ADMIN — METRONIDAZOLE SCH MLS: 500 INJECTION, SOLUTION INTRAVENOUS at 10:41

## 2020-02-03 RX ADMIN — METRONIDAZOLE SCH MLS: 500 INJECTION, SOLUTION INTRAVENOUS at 18:23

## 2020-02-03 NOTE — CON
DATE OF CONSULTATION:  2020



SERVICE:  Nephrology.



REASON FOR CONSULTATION:  Hyponatremia.



REQUESTING PHYSICIAN:  Dr. Edward Valverde.



HISTORY OF PRESENT ILLNESS:  69-year-old female, brought in due to right hip pain

sustained after a fall earlier today.  The patient reportedly has been falling in

the last several days.  Also reported to have generalized weakness.  The patient

complains also of right-sided occipital pain and headache.  She also admitted to

chronic cough and wheezing.  She also recently was found to have brain cancer for

which she is to see a physician for that next week.  In the ER, the patient was

found to have hyponatremia with sodium of 111, necessitating Nephrology consult.

She also was found to have right hilar mass concerning for lymphadenopathy or aortic

aneurysm hence was further evaluated with CT aortic dissection protocol, which

showed lung mass with metastasis to the liver.  The patient also was evaluated with

CT scan of the head, which showed an intracranial metastasis as well as right

occipital scalp metastasis. The patient denied nausea, vomiting, abdominal pain,

dysuria, but admitted to bilateral leg edema which has been ongoing for several

weeks.  The patient denied dizziness prior to having falls, but reported that most

of the time she is weak and falls on trying to ambulate.  She also denied fever and

chills. 



PAST MEDICAL HISTORY:  

1. Glaucoma.

2. Chronic tobacco abuse.

3. Chronic daily alcohol use.

4. Recent diagnosis of brain cancer.



PAST SURGICAL HISTORY:  

1.  section.

2. Partial hysterectomy.

3. Tonsillectomy.



FAMILY HISTORY:  Significant for liver cancer in father.  Both parents are late.



SOCIAL HISTORY:  The patient lives with spouse.  She admitted to daily alcohol use

with last use being earlier today.  She reportedly takes about 3-4 drinks every day.

 She also smokes about half a pack daily for more than 40 years.  Denied

recreational drug use. 



ALLERGIES:  PENICILLIN, CODEINE.



HOME MEDICATION:  Eye drop.



REVIEW OF SYSTEMS:  12-point review of system performed was unremarkable other than

pertinent positives and negatives included in the history of present illness. 



PHYSICAL EXAMINATION:

VITAL SIGNS:  Temperature 98.1, pulse 99, respiratory rate 22, SpO2 of 92% on room

air, blood pressure is 123/77. 

GENERAL:  Elderly-looking female, in mild painful distress.  Afebrile.  Anicteric.

Acyanotic. 

HEENT:  Normocephalic, atraumatic.  Right occipital; small soft mass noted.  Oral

mucosa is moist. 

NECK:  Supple with no JVD or lymphadenopathy. 

CARDIOVASCULAR:  Regular rhythm and rate with normal. heart sounds one and two. 

RESPIRATORY:  Fair air entry bilaterally with some transmitted breath sounds and few

scattered rhonchi.  Work of breathing is not increased. 

GI:  Full, soft, nontender, nondistended with normal bowel sounds. 

EXTREMITIES:  Right lower extremity is laterally rotated.  Moderate bilateral leg

edema noted as well.  Mild shortening of the right lower extremity also is noted. 

CNS:  The patient is drowsy.  She is otherwise conversational and oriented to person

and place.  Mild memory lapse is noted. 



DIAGNOSTIC DATA:  CBC showed WBC count of 13.6, hemoglobin of 13.0, MCV of 98.3, and

platelet of 277. 



Coagulation panel showed  PT 15.1, INR 1.2, and  PTT 28.5, and D-dimer 4.25. 



CMP showed sodium 111, potassium 3.1, chloride 70, CO2 of 28, BUN 9, creatinine

0.49, glucose 124, calcium 8.4, total bilirubin 0.9, AST 39, ALT 45, alkaline

phosphatase 96, total protein 5.6, albumin 3.7, globulin 1.9. 



Lipase is 27. 



Initial cardiac markers showed , troponin 0.011, and BNP 28.2. 



Urinalysis showed yellow clear urine with pH of 6.0, specific gravity of 1.045,

urine protein of 20, normal urine glucose, ketones 40, and negative blood, nitrite,

and bilirubin.  Leukocyte esterase is 75. 



Microscopy showed 0 to 3 rbc's and 11 to 20 wbc's with 3+ bacteria. 



Chest x-ray showed right suprahilar mass, possibly related to an aneurysm,

lymphadenopathy, or hilar malignancy. 



Hip x-ray showed moderate displaced right hip intertrochanteric fracture. 



Pelvic x-ray also showed comminuted, angulated right hip intertrochanteric fracture. 



CT scan of the brain compared to prior CT of 2020 showed stable

intracranial metastatic disease as well as right parietal skull metastatic lesion

with no acute interval intracranial abnormality demonstrated. 



CT aortic dissection protocol showed large right suprahilar mass invading the right

aspect of the mediastinum, extending up into the superior right paratracheal region

and surrounding the SVC.  The lesion also invades the right upper lobe bronchus

completely obstructing the right upper lobe bronchus.  There is circumferential

involvement of the lesion with the right mainstem bronchus and there is malignant

lymphadenopathy within the right subclavicular region and anterior mediastinum.

There is also metastatic lesion within the right pleural space with a small right

pleural effusion.  There are multiple hepatic metastatic lesions as well as

bilateral adrenal metastatic lesions.  There is malignant lymphadenopathy of the

upper abdomen.  No acute aortic stenosis, occlusion, aneurysmal formation was noted.

 Numerous age indeterminate thoracolumbar spinal compression abnormalities noted. 



ASSESSMENT:  

1. Severe hyponatremia:  Most likely due to syndrome of inappropriate antidiuretic

hormone secretion related to metastatic disease.  Hypokalemia may also be

contributory to the hyponatremia. 

2. Reported frequent falls, may be related to this severe hyponatremia.  However,

the patient had metastatic lesion, which may also be contributory or responsible. 

3. Hypokalemia.

4. Metastatic right suprahilar mass, most likely metastatic lung disease with

metastasis to the liver and brain. 

5. Brain metastatic lesion.

6. Liver masses.

7. Bilateral leg edema:  Most likely due to lymphadenopathy of the abdomen with

decreased in fatigue and venous drainage.  Diastolic heart failure is also a

concern. 

8. Chronic alcohol use with high risk for withdrawal.

9. Chronic tobacco abuse.

10. Chronic cough.

11. Presumed obstructive pneumonia.

12. Presumed chronic obstructive pulmonary disease.



PLAN:  

1. We will replete serum potassium with potassium chloride.

2. We will get serum magnesium and replete if indicated.

3. The patient already received 1 L of normal saline in the ER.  Given bilateral leg

edema and features suggestive of fluid overload, we will give 40 mg of Lasix.  We

will not be giving any further IV fluid for now.  We will recheck renal function in

the morning. 

4. Analgesic as per primary attending for the right intertrochanteric fracture.

5. Further treatment to follow depending on hospital course. 



Many thanks for involving us in the care of this patient.  We will follow along with

you. 







Job ID:  [id number]

## 2020-02-03 NOTE — CON
DATE OF CONSULTATION:  



Ms. Mayorga is a 69-year-old woman with an unfortunate diagnosis of metastatic lung

cancer to the brain as well as to the soft tissues and scalp.  There is a question

of this represent small cell carcinoma and for this purpose, Neurosurgery was

consulted for possible scalp lesion biopsy.  There is bony erosion of the calvarium

beneath this lesion almost entirely.  When sitting at her bedside, she records it

for 90% oxygen on BiPAP to remain proper blood oxygenation level, although other

than that she appears to be neurologically intact.  I discussed the recommended

procedure being right parietal biopsy with the patient as well as her granddaughter

in the room.  The patient ultimately agreed and we will proceed with biopsy. 







Job ID:  [id number]

## 2020-02-03 NOTE — PDOC.HOSPP
- Subjective


Encounter Date: 02/03/20


Encounter Time: 13:00


Subjective: 





awake, has sob, pain in right leg is slightly better


grand daughter at bedside





- Objective


Vital Signs & Weight: 


 Vital Signs (12 hours)











  Temp Pulse Resp Pulse Ox


 


 02/03/20 15:26  96.8 F L   


 


 02/03/20 11:40  96.0 F L   


 


 02/03/20 08:00  96.6 F L    93 L


 


 02/03/20 07:06   91  22 H  89 L








 Weight











Weight                         107 lb 12.8 oz











 Most Recent Monitor Data











Heart Rate from ECG            105


 


NIBP                           117/68


 


NIBP BP-Mean                   84


 


Respiration from ECG           24


 


SpO2                           98














I&O: 


 











 02/02/20 02/03/20 02/04/20





 06:59 06:59 06:59


 


Intake Total 1250 940 


 


Output Total 950 1075 


 


Balance 300 -135 











Result Diagrams: 


 02/03/20 03:36





 02/03/20 14:53





Hospitalist ROS





- Medication


Medications: 


Active Medications











Generic Name Dose Route Start Last Admin





  Trade Name Freq  PRN Reason Stop Dose Admin


 


Acetaminophen  650 mg  02/01/20 22:48  02/03/20 10:29





  Tylenol  PO   650 mg





  Q4H PRN   Administration





  Headache/Fever/Mild Pain (1-3)   





     





     





     


 


Albuterol/Ipratropium  3 ml  02/02/20 13:00  02/03/20 15:29





  Duoneb  NEB   Not Given





  [id number] TI   





     





     





     





     


 


Famotidine  20 mg  02/02/20 09:00  02/03/20 10:26





  Pepcid  PO   20 mg





  BID TI   Administration





     





     





     





     


 


Fentanyl  25 mcg  02/03/20 11:00  02/03/20 11:59





  Duragesic  TD   25 mcg





  Q3D TI   Administration





     





     





     





     


 


Levofloxacin 500 mg/ Device  100 mls @ 100 mls/hr  02/02/20 22:00  02/02/20 22:

34





  IVPB   100 mls





  Q24HR TI   Administration





     





     





     





     


 


Metronidazole 500 mg/ Device  100 mls @ 100 mls/hr  02/02/20 19:00  02/03/20 10:

41





  IVPB   100 mls





  0300,1100,1900 TI   Administration





     





     





     





     


 


Magnesium Oxide  400 mg  02/02/20 09:00  02/03/20 10:27





  Magnesium Oxide  PO   400 mg





  BID TI   Administration





     





     





     





     


 


Melatonin  6 mg  02/02/20 22:47  02/02/20 23:10





  Melatonin  PO   6 mg





  HSPRN PRN   Administration





  Insomnia   





     





     





     


 


Methylprednisolone Sodium Succinate  20 mg  02/03/20 14:00  02/03/20 16:32





  Solu-Medrol  IVP   20 mg





  Q8HR TI   Administration





     





     





     





     


 


Saccharomyces Zioni  250 mg  02/02/20 09:00  02/03/20 10:27





  Florastor  PO   250 mg





  DAILY TI   Administration





     





     





     





     














- Exam


General Appearance: awake alert, ill appearing


Eye: PERRL, anicteric sclera


ENT: no oropharyngeal lesions, dry oral mucosa


Neck: supple, no JVD


Heart: RRR, no murmur


Respiratory: rhonchi, tachypneic, wheezes


Gastrointestinal: soft, non-tender, non-distended, normal bowel sounds


Extremities: no edema


Extremities - other findings: right LE is externally rotated and shortened


Neurological: cranial nerve grossly intact, no focal deficits





Hosp A/P


(1) Metastatic cancer


Code(s): C79.9 - SECONDARY MALIGNANT NEOPLASM OF UNSPECIFIED SITE   Status: 

Acute   





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


Status: Acute   





(3) Right femoral fracture


Code(s): S72.91XA - UNSP FRACTURE OF RIGHT FEMUR, INIT FOR CLOS FX   Status: 

Acute   


Qualifiers: 


   Encounter type: subsequent encounter   Femur location: intertrochanteric   

Fracture type: closed   Fracture alignment: displaced 





(4) Lung mass


Code(s): R91.8 - OTHER NONSPECIFIC ABNORMAL FINDING OF LUNG FIELD   Status: 

Acute   





(5) multiple liver mets


Status: Acute   





(6) Chronic anemia


Code(s): D64.9 - ANEMIA, UNSPECIFIED   Status: Chronic   





(7) Tobacco abuse


Code(s): Z72.0 - TOBACCO USE   Status: Chronic   





(8) Alcohol use disorder, mild, abuse


Code(s): F10.10 - ALCOHOL ABUSE, UNCOMPLICATED   Status: Chronic   





(9) FTT (failure to thrive) in adult


Status: Acute   





- Plan





is on fluid restriction and tolvaptan for siadh, sodium slightly better at 122.


poor prognosis with multiple mets from likely lung primary to occipital lobe, 

parietal skull, multiple large liver mets, adrenal glands and abd lymph nodes 

as well.


nebs, steroids to perk her up a bit with hypoxia now


low dose fentanyl tts for hip fracture and cancer


d/w  extensively, pt is DNAR, he is aware of poor prognosis, their 

children will be arriving to see her (2/2/2020).


Palliative care consultation.


NSX has been consulted for parietal bone lesion biopsy for establishing 

diagnosis.

## 2020-02-03 NOTE — PRG
DATE OF SERVICE:  02/02/2020



SERVICE:  Nephrology.



SUBJECTIVE:  A 69-year-old female, seen in followup for severe hyponatremia.  The

patient was brought in after a fall, during which she sustained trauma to the right

hip and was found to have comminuted fracture of right hip.  She was also found to

have cough and bilateral leg edema as well as metastatic cancer, thought to be

primarily of the lungs.  The patient is sleepy, but wakes up with stimulation.  She

complains of pain in the right hip.  She has been getting some pain medication. 



OBJECTIVE:  VITAL SIGNS:  Temperature 97.6, pulse 92, respiratory rate 20, SpO2 of

96 on oxygen supplementation, blood pressure is 116/74. 

GENERAL:  Chronically ill-looking female, in no obvious distress.  The patient is

sleepy, but easily arousable. 

HEENT:  Atraumatic.  Face is symmetrical.  Right parieto-occipital soft tissue mass

noted. 

NECK:  No JVD. 

CARDIOVASCULAR:  Regular rhythm and rate with normal heart sounds. 

RESPIRATORY:  Fair air entry bilateral with coarse transmitted breath sounds and

questionable crackles.  No obvious rhonchi or use of accessory muscles was

appreciated. 

GI:  Full, soft, nontender, nondistended with normal bowel sounds. 

EXTREMITIES:  Right lower extremity is shortened and laterally rotated.  Bilateral

leg edema noted. 

CNS:  The patient is sleeping, but easily arousable.  Oriented x3.  Mental status

seems appropriate.  Moves all extremities except right lower extremity, which is

limited by pain. 



DIAGNOSTIC DATA:  CBC showed WBC count of 12.6, hemoglobin of 10.0, MCV of 98.6,

platelets of 230. 



Renal function panel showed sodium 114, potassium 3.7, chloride 75, CO2 of 30, BUN

7, creatinine 0.47, glucose 112, calcium 8.0, phosphorus 3.3, albumin 3.9. 



Serum osmolality is 231, while urine osmolality is 425.



ASSESSMENT:  

1. Severe hyponatremia:  This is deemed to be symptomatic, given history of frequent

falls.  However, brain metastasis maybe the cause of the frequent falls.  Mental

status overall seems to be stable.  The drowsiness most likely is related to

narcotic analgesic.  However, contribution from the hyponatremia could not be ruled

out. 

2. Metastatic lung cancer with metastasis to the brain, liver, and adrenals.

3. Bilateral leg edema due to lymphadenopathy and venous occlusion.



PLAN:  

1. We will start free water restriction to 1 L per 24 hours.

2. Given that the patient is neurologically stable, we will not be aggressively

increasing serum sodium.  However, we will recheck BMP this afternoon and depending

on the level, we will consider hypertonic solution. 

3. Right hip fracture.  Management as per Orthopedics.

4. Metastatic lung cancer.  Evaluation and treatment as per Pulmonology and

Oncology.  Further treatment to follow depending on hospital course. 



ADDENDUM:  Repeat BMP showed serum sodium of 114.  We will give 100 mL of hypertonic

solution and continue fluid restriction to 1 L per 24 hours.  We will also continue

to monitor serum sodium. 







Job ID:  [id number]

## 2020-02-03 NOTE — PRG
DATE OF SERVICE:  02/03/2020



SERVICE:  Pulmonary Medicine.



INTERVAL HISTORY:  I spoke with multiple different services today.  Both General

Surgery and Interventional Radiology did not want to do a biopsy of this 
superficial

lesion.  As such, I spoke with Neurosurgery.  They are willing and able to do 
the

thing that we need done.  I am very grateful for their service.  At this point, 
she

remains on very high amounts of oxygen.  She indicates that she is breathing 
fairly

comfortably.  She has no complaints of nausea, vomiting, or diarrhea otherwise.

This morning, she was actually transitioned on to 4 L nasal cannula, and her

saturations are better than I would have expected. 



Overnight, BiPAP must have been effective at improving her oxygenation status.



PHYSICAL EXAMINATION:

VITAL SIGNS:  Afebrile, pulse 91, blood pressure 125/78, respirations 22, and

saturation 89% on 4 L nasal cannula. 

GENERAL:  The patient is awake and alert, in no apparent distress. 

LUNGS:  Wonderful air entry on the left.  The right has crackling present.  No

prolonged expiratory phase or wheezing. 

HEART:  Normal rate, regular. 

ABDOMEN:  Soft, nontender, nondistended.  Bowel sounds are positive. 

MUSCULOSKELETAL:  No cyanosis or clubbing.  No pitting in the bilateral lower

extremities. 

NEUROLOGIC:  Grossly nonfocal.



LABORATORY DATA:  WBC 16.3, hemoglobin 10.1, and platelets 226,000.  INR 1.2.

Sodium is once again downtrending.  Basic metabolic profile is otherwise

unremarkable.  Urinalysis is negative.  Blood cultures x2, influenza, and urine

culture negative to date. 



ASSESSMENT:  

1. Acute hypoxic respiratory failure.

2. Hyponatremia, likely secondary to SIADH.

3. Widely metastatic process, including pulmonary mass, mediastinal 
lymphadenopathy,

liver lesions, brain lesions, and scalp lesion. 

4. Mechanical fall secondary to medical issues resulting in intertrochanteric 
hip

fracture. 



DISCUSSION AND PLAN:  The patient is doing fine from respiratory standpoint.  
We are

going to try to do a biopsy of the superficial lesion today.  Hopefully, this 
will

provide us with an answer.  I am grateful that her oxygen status is temporarily

improved and that she is tolerating a BiPAP break.  If this is a small cell lung

cancer, there is a possibility the patient could have a very robust response to

chemotherapy, which could provide her with meaningful time with her family over 
the

next year to two.  Pulmonary will continue to follow. 







Job ID:  [id number]



[facility]

## 2020-02-03 NOTE — PRG
DATE OF SERVICE:  02/03/2020



SERVICE:  Nephrology.



SUBJECTIVE:  A 69-year-old, admitted with right comminuted hip fracture.  The

patient also was found to have metastatic lung cancer with metastasis to the brain

and liver as well as adrenal.  Nephrology is following the patient for severe

hyponatremia.  The patient is more awake today and conversational.  She reports

feeling a lot better. 



OBJECTIVE:  VITAL SIGNS:  Temperature 96.6, pulse 91, respiratory rate 22, SpO2 of

93% on 3 L nasal cannula, blood pressure is 125/78. 

GENERAL:  Female, in no obvious distress.  Afebrile, acyanotic. 

HEENT:  Normocephalic and atraumatic.  Oral mucosa is moist. 

NECK:  Supple with no JVD. 

CARDIOVASCULAR:  Regular rhythm and rate with normal heart sounds one and two. 

RESPIRATORY:  Coarse crackles and transmitted breath sounds heard in all lung zones.

 Work of breathing however is not increased. 

GI:  Full, soft, nondistended with normal bowel sounds. 

UROGENITAL:  Oseguera catheter is in place. 

EXTREMITIES:  Right lower extremity is held in lateral rotation and mild flexion at

the knee.  The right thigh edema and tenderness noted as well as bilateral leg

edema. 

CNS:  Conscious, alert and oriented x3 with appropriate mental status.  Cranial

nerves 2 through 12 are grossly intact. 



DIAGNOSTIC DATA:  CBC showed WBC count of 16.3, hemoglobin of 10.1, platelet of 226. 



Chemistry showed sodium 116, potassium 4.3, chloride 82, CO2 of 27, BUN 5,

creatinine 0.45, glucose 141, calcium 8.3.  Note that serum sodium increased to a

peak of 120 after hypertonic solution and progressively trended downwards to a brannon

of 116 currently. 



ASSESSMENT:  

1. Severe hyponatremia:  The patient reported frequent fall and generalized

weakness.  This was thought to be due to syndrome of inappropriate antidiuretic

hormone secretion related to metastatic cancer.  The patient is on fluid

restriction, but there is no significant increase in serum sodium.  There seems to

be some component of poor solute intake as well as serum sodium increased to 120

plus from 114 after 100 mL of hypertonic solution. 

2. Hypokalemia:  Repleted.

3. Metastatic lung cancer with mets to the brain, scalp, liver, and adrenal.

4. Chronic bilateral leg edema.

5. Comminuted right hip fracture:  Treatment as per Orthopedic Surgery.

6. Chronic obstructive pulmonary disease with acute exacerbation.

7. Acute on chronic respiratory failure due to lung cancer with obstructive

pneumonia and chronic obstructive pulmonary disease exacerbation. 



PLAN:  

1. We will start the patient on low-dose Samsca 15 mg daily given no significant

improvement with fluid restriction alone. 

2. We will monitor oral intake as well as output.

3. We will relax fluid restriction a little bit to allow 2 L of fluid per 24 hours

while on Samsca. 

4. Further treatment to follow depending on hospital course.







Job ID:  [id number]

## 2020-02-04 LAB
ANION GAP SERPL CALC-SCNC: 11 MMOL/L (ref 10–20)
ANION GAP SERPL CALC-SCNC: 8 MMOL/L (ref 10–20)
BUN SERPL-MCNC: 8 MG/DL (ref 9.8–20.1)
BUN SERPL-MCNC: 8 MG/DL (ref 9.8–20.1)
CALCIUM SERPL-MCNC: 9 MG/DL (ref 7.8–10.44)
CALCIUM SERPL-MCNC: 9.1 MG/DL (ref 7.8–10.44)
CHLORIDE SERPL-SCNC: 84 MMOL/L (ref 98–107)
CHLORIDE SERPL-SCNC: 86 MMOL/L (ref 98–107)
CO2 SERPL-SCNC: 34 MMOL/L (ref 23–31)
CO2 SERPL-SCNC: 36 MMOL/L (ref 23–31)
CREAT CL PREDICTED SERPL C-G-VRATE: 87 ML/MIN (ref 70–130)
CREAT CL PREDICTED SERPL C-G-VRATE: 89 ML/MIN (ref 70–130)
GLUCOSE SERPL-MCNC: 138 MG/DL (ref 80–115)
GLUCOSE SERPL-MCNC: 181 MG/DL (ref 80–115)
POTASSIUM SERPL-SCNC: 3.9 MMOL/L (ref 3.5–5.1)
POTASSIUM SERPL-SCNC: 4 MMOL/L (ref 3.5–5.1)
SODIUM SERPL-SCNC: 125 MMOL/L (ref 136–145)
SODIUM SERPL-SCNC: 126 MMOL/L (ref 136–145)

## 2020-02-04 PROCEDURE — 3E03305 INTRODUCTION OF OTHER ANTINEOPLASTIC INTO PERIPHERAL VEIN, PERCUTANEOUS APPROACH: ICD-10-PCS | Performed by: INTERNAL MEDICINE

## 2020-02-04 RX ADMIN — METRONIDAZOLE SCH MLS: 500 INJECTION, SOLUTION INTRAVENOUS at 05:00

## 2020-02-04 RX ADMIN — Medication PRN ML: at 21:14

## 2020-02-04 RX ADMIN — METRONIDAZOLE SCH MLS: 500 INJECTION, SOLUTION INTRAVENOUS at 18:15

## 2020-02-04 RX ADMIN — METRONIDAZOLE SCH MLS: 500 INJECTION, SOLUTION INTRAVENOUS at 12:49

## 2020-02-04 NOTE — PDOC.PALCO
Palliative Care Consult





- Consult Details


Requesting Physician: Dr Roberts


Reason for Consult: goals of care, family support, complex decision-making


Family Members Present: Patient son, daughter in law, and two grand daughters





- Pertinent HPI


69 year old female who had a fall at home, presented to the emergency room for 

evaluation. Reported that she had had an increaser in weakness and recent 

falls. History of brain cancer.  CT in emergency room identified a mass 

involving the right suprahilar region with invasion to the right upper lobe 

bronchus as well as hepatic lesions and bilateral adrenal metastatic lesions 

with malignant lymphadenophaty in the upper aspect of the abdomen. X-ray of hip 

conformed intertrochanteric fracture.  Also confirmation of Intracranial 

metastatic disease and right parietal skull metastatic lesion. Admitted to the 

[facility] for medical management and further evaluation. 








- Pertinent PMH





Glaucoma, possible brain cancer





- Social History


Alcohol Use: none


Drug Use History: none


Living Situation: independent





- Allergies


Allergies/Adverse Reactions: 


 Allergies











Allergy/AdvReac Type Severity Reaction Status Date / Time


 


codeine Allergy   Verified 02/02/20 03:44


 


Penicillins Allergy   Verified 02/02/20 03:44














- Subjective





BiPap with significant work of breathing. Family at bedside. Patient able to 

nob to questions and makes eye contact. Difficult to obtain a ROS secondary to 

labored respirations. 





- ROS


Constitutional: weakness


ENT: dry mouth


Respiratory: shortness of breath


Psychological: anxiety





- Objective


Vital Signs: 


 Vital Signs - Most Recent











Temp Pulse Resp BP Pulse Ox


 


 97.9 F   112 H  40 H     92 L


 


 02/04/20 03:57  02/04/20 13:54  02/04/20 13:52     02/04/20 13:52











Palliative Performance Scale: 30





- Physical Exam


Constitutional: ill appearing, moderate distress


HEENT: EOMI, moist MMs, sclera anicteric


Respiratory: no wheezing, labored respirations


Cardiovascular: RRR


Gastrointestinal: non-tender, no distention, positive bowel sounds


Musculoskeletal: no cyanosis, no clubbing, pulses present, edema present


Skin: fragile, friable


Psychiatric: A&O x 3





- Problem List


(1) Palliative care encounter


Code(s): Z51.5 - ENCOUNTER FOR PALLIATIVE CARE   Current Visit: Yes   Status: 

Acute   





(2) Acute respiratory failure with hypoxia


Code(s): J96.01 - ACUTE RESPIRATORY FAILURE WITH HYPOXIA   Current Visit: Yes   

Status: Acute   





(3) FTT (failure to thrive) in adult


Current Visit: Yes   Status: Acute   





(4) Right femoral fracture


Code(s): S72.91XA - UNSP FRACTURE OF RIGHT FEMUR, INIT FOR CLOS FX   Current 

Visit: Yes   Status: Acute   


Qualifiers: 


   Encounter type: subsequent encounter   Femur location: intertrochanteric   

Fracture type: closed   Fracture alignment: displaced 





(5) multiple liver mets


Current Visit: Yes   Status: Acute   





(6) small cell lung cancer with metastases


Current Visit: Yes   Status: Acute   





(7) Chronic anemia


Code(s): D64.9 - ANEMIA, UNSPECIFIED   Current Visit: Yes   Status: Chronic   





- Plan/Recommendations


Plan: Reviewed patient chart and insight from Oncology prior to encounter.  

Introduced Palliative Care to patient and family at bedside.  Discussed goals 

of care related to current disease processes, specifically confirmed small cell 

lung cancer and what the patient is hopeful for.  General life review with 

patient and family, although tearful they appear to be supportive of one 

another and had a gentle laughter as they all talked.  Therapeutic listening an 

emotional support





*Patient hopes to begin chemo 2/4/2020


*Hopeful to eventually have "breaks" from Bipap


*Will follow up and further visit in relation to goals of care 


*Firm DNAR


*Aggressive measures with the exception of DNAR/DNI























[70] minutes spent on this encounter with >50% of the time in counseling and 

coordination of care.





Thank you for this very appropriate consult.

## 2020-02-04 NOTE — PDOC.MOPN
Interval History: 





Pt states her breathing feels easier, currently on BiPAP. Says she wants her 

hip surgery. I discussed her path results with her, morphology most consistent 

with Small Cell Carcinoma. I explained to her that she is too ill for hip 

surgery at this time and if her SCLC is not treated that she will cont to 

worsen. I discussed starting treatment with chemotherapy including side effects 

of fatigue, N/V/D, infusion reactions, and cytopenias. She agrees to proceed.





- Vital Signs


Vital Signs: 


 Vital Signs (12 hours)











  Temp Pulse Resp Pulse Ox


 


 02/04/20 07:47   102 H  


 


 02/04/20 07:46   101 H  16  91 L


 


 02/04/20 03:57  97.9 F   


 


 02/03/20 23:56   97  


 


 02/03/20 23:21  97.5 F L   








 Weight











Weight                         109 lb 9.6 oz











 Most Recent Monitor Data











Heart Rate from ECG            94


 


NIBP                           108/67


 


NIBP BP-Mean                   80


 


Respiration from ECG           17


 


SpO2                           91

















- Physical Exam


General: Alert, Oriented x3, Cooperative, Mild distress


HEENT: EOMI


Lungs: Other (less wheezing than prior)


Cardiovascular: Regular rate


Abdomen: Soft


Neurological: Cranial nerves 3-12 NL


Psych/Mental Status: Mental status NL





- Labs


Result Diagrams: 


 02/03/20 03:36





 02/04/20 03:36


Lab results: 


 Laboratory Results - last 24 hr





02/04/20 03:36: Sodium 126 L, Potassium 4.0, Chloride 86 L, Carbon Dioxide 36 H

, Anion Gap 8 L, BUN 8 L, Creatinine 0.46 L, Estimated GFR (MDRD) Greater than  

90, Glucose 138 H, Calcium 9.0


02/03/20 20:57: Sodium 128 L, Potassium 4.1, Chloride 86 L, Carbon Dioxide 36 H

, Anion Gap 10, BUN 10, Creatinine 0.51 L, Estimated GFR (MDRD) Greater than  90

, Glucose 202 H, Calcium 9.2


02/03/20 14:53: Sodium 122 L, Potassium 4.2, Chloride 82 L, Carbon Dioxide 33 H

, Anion Gap 11, BUN 9 L, Creatinine 0.57 L, Estimated GFR (MDRD) Greater than  

90, Glucose 228 H, Calcium 8.9











- Pathology


Pathology: 





Scalp biopsy - most consistent with Small Cell Carcinoma





A/P





- Problem


(1) Metastatic cancer


Current Visit: Yes   Code(s): C79.9 - SECONDARY MALIGNANT NEOPLASM OF 

UNSPECIFIED SITE   Status: Acute   





(2) Right femoral fracture


Current Visit: Yes   Code(s): S72.91XA - UNSP FRACTURE OF RIGHT FEMUR, INIT FOR 

CLOS FX   Status: Acute   


Qualifiers: 


   Encounter type: subsequent encounter   Femur location: intertrochanteric   

Fracture type: closed   Fracture alignment: displaced 





(3) SIADH (syndrome of inappropriate ADH production)


Current Visit: Yes   Status: Acute   





- Plan


Plan: 





** start Carboplatin + VP16, Days 1-3, followed by Fulphila on Day 4. Cannot 

receive Tecentriq in the hospital so will hopefully add with C2.


   ~~ Daily CBC, CMP


** trend sodium, should improve over time with treatment of SCLC


** Monitor brain lesions for now given asymptomatic and systemic disease more 

of a risk at this time


** prognosis guarded

## 2020-02-04 NOTE — PRG
DATE OF SERVICE:  02/04/2020



SUBJECTIVE:  Ms. Mayorga is a 69-year-old white female, seen by the Renal Service for

hyponatremia.  She is being followed up by Dr. Jean and I am covering for Dr. Jean.

She has been started on tolvaptan.  This was placed on hold yesterday. 



Serum sodium is relatively stable.  She was also told by her oncologist that her

histopath from the lung showed small cell carcinoma.  Discussion for chemotherapy

was discussed and the patient wished to proceed with this. 



OBJECTIVE:  VITAL SIGNS:  Blood pressure 108/67, heart rate 101, respiratory rate

16, pulse ox 91%, the patient noted on BiPAP. 

GENERAL:  She is awake, not in overt distress. 

SKIN:  Adequate turgor. 

HEENT:  She has pinkish conjunctivae.  Anicteric sclerae. 

NECK:  No neck mass.  No carotid bruits.  No JVD. 

CHEST:  No deformities. 

LUNGS:  Decreased breath sounds. 

HEART:  Normal sinus rhythm.  No murmur.  No gallops.  No rubs. 

ABDOMEN:  Globular, soft, nontender.  No masses. 

EXTREMITIES:  Trace edema.



MEDICATIONS:  Medications of February 4, 2020, were reviewed.



LABORATORY DATA:  Laboratories of February 4, 2020; sodium 126, potassium 4,

chloride 86, carbon dioxide 36, BUN 8, creatinine 0.46, glucose 138, calcium 9. 



White count 16.3, hemoglobin 10.1.



ASSESSMENT AND PLAN:  

1. Hyponatremia secondary to presumed syndrome of inappropriate antidiuretic hormone

secretion.  The patient was placed on tolvaptan with improvement of serum sodium.

This was placed on hold recently due to the rapid increase in the serum sodium.

Today's serum sodium is 126, and yesterday, it is 128.  If the patient remains

unimproved, we can resume back the tolvaptan at 50 mg tablet once a day.  Continue

free water restriction. 

2. Lung cancer - the patient is followed by her hematologist.  The possibility of

chemotherapy is being entertained. 

3. Chronic obstructive pulmonary disease?  Currently on BiPAP.  Pulmonary following.

4. Agree with current management.







Job ID:  [id number]

## 2020-02-04 NOTE — PRG
DATE OF SERVICE:  02/04/2020



SERVICE:  Pulmonary Medicine.



INTERVAL HISTORY:  The patient is doing poorly from respiratory standpoint.  Oxygen

requirements are increasing and she is not able to tolerate any breaks off her

BiPAP.  We were able to do an FNA at bedside, and discovered that she does in fact

have small cell carcinoma.  She is going to end up getting chemotherapy today.

Denies any fevers or chills.  There were no significant overnight events and she is

actually quite a bit more awake today. 



PHYSICAL EXAMINATION:

VITAL SIGNS:  Afebrile, pulse 101, blood pressure 108/67, respirations 16, and

saturation 91%, currently on 95% delivered via BiPAP. 

HEENT:  Normocephalic and atraumatic.  Sclerae are white.  Conjunctivae are pink.

Oral mucosa is moist without lesions. 

LUNGS:  Decent air entry.  No prolonged expiratory phase or wheezing is appreciated. 

HEART:  Normal rate.  Regular. 

ABDOMEN:  Soft, nontender, and nondistended.  Bowel sounds are positive. 

MUSCULOSKELETAL:  No cyanosis or clubbing.  No pitting in the bilateral lower

extremities. 

NEUROLOGIC:  Grossly nonfocal.



LABORATORY DATA:  WBC 16.3, hemoglobin 10.1, platelets 226,000.  INR 1.2.  Sodium

125, potassium 3.9.  Chloride 84, bicarb 34.  Basic metabolic profile is otherwise

unremarkable.  Influenza A and B, blood cultures x2, and urine culture remain

negative to date.  FNA of the scalp lesion is positive for malignant cells.  The

morphology is consistent with small cell carcinoma.  Immunohistochemical stains will

be performed. 



ASSESSMENT:  

1. Acute hypoxic respiratory failure.

2. Hyponatremia, secondary to syndrome of inappropriate antidiuretic hormone

secretion. 

3. Small cell carcinoma, widely metastatic.

4. Mechanical fall secondary to these medical issues, resulting in intertrochanteric

hip fracture (pathologic fracture not excluded). 



DISCUSSION AND PLAN:  The patient is doing very poorly from respiratory standpoint.

From my perspective, we are trying to thread the eye of the needle here.  She does

not have any room for getting worse.  She remains a very firm DNI/DNR.  Hopefully,

we can get her to chemotherapy, so that we have a chance of melting this cancer away

and providing her with some degree of remission.  The family is extremely realistic

and understands that without securing her airway at this point, if things got worse

before they get better, she is unlikely to survive this event.  We are hoping for

the final stains to come back today, so that we can move forward with chemotherapy

as soon as possible.  She will remain in the [facility]. 







Job ID:  [id number]

## 2020-02-04 NOTE — PDOC.HOSPP
- Subjective


Encounter Date: 02/04/20


Encounter Time: 11:30


Subjective: 





is on bipap, awake, responds to verbal stimuli


daughter in law at bedside





- Objective


Vital Signs & Weight: 


 Vital Signs (12 hours)











  Temp Pulse Resp Pulse Ox


 


 02/04/20 13:54   112 H  


 


 02/04/20 13:52   111 H  40 H  92 L


 


 02/04/20 12:00     92 L


 


 02/04/20 11:41   106 H  


 


 02/04/20 08:00     97


 


 02/04/20 07:47   102 H  


 


 02/04/20 07:46   101 H  16  91 L


 


 02/04/20 03:57  97.9 F   








 Weight











Weight                         109 lb 9.6 oz











 Most Recent Monitor Data











Heart Rate from ECG            108


 


NIBP                           117/71


 


NIBP BP-Mean                   86


 


Respiration from ECG           35


 


SpO2                           88














I&O: 


 











 02/03/20 02/04/20 02/05/20





 06:59 06:59 06:59


 


Intake Total 940 1811 


 


Output Total 1075 2400 


 


Balance -135 -589 











Result Diagrams: 


 02/03/20 03:36





 02/04/20 09:00





Hospitalist ROS





- Medication


Medications: 


Active Medications











Generic Name Dose Route Start Last Admin





  Trade Name Freq  PRN Reason Stop Dose Admin


 


Acetaminophen  650 mg  02/01/20 22:48  02/03/20 10:29





  Tylenol  PO   650 mg





  Q4H PRN   Administration





  Headache/Fever/Mild Pain (1-3)   





     





     





     


 


Albuterol/Ipratropium  3 ml  02/02/20 13:00  02/04/20 13:52





  Duoneb  NEB   3 ml





  [id number] TI   Administration





     





     





     





     


 


Dexamethasone  10 mg  02/04/20 13:15  02/04/20 15:25





  Decadron  SLOW IVP  02/04/20 21:00  10 mg





  ONE TI   Administration





     





     





     





     


 


Famotidine  20 mg  02/02/20 09:00  02/04/20 14:34





  Pepcid  PO   Not Given





  BID TI   





     





     





     





     


 


Fentanyl  25 mcg  02/03/20 11:00  02/03/20 11:59





  Duragesic  TD   25 mcg





  Q3D TI   Administration





     





     





     





     


 


Levofloxacin 500 mg/ Device  100 mls @ 100 mls/hr  02/02/20 22:00  02/03/20 21:

31





  IVPB   100 mls





  Q24HR TI   Administration





     





     





     





     


 


Metronidazole 500 mg/ Device  100 mls @ 100 mls/hr  02/02/20 19:00  02/04/20 12:

49





  IVPB   100 mls





  0300,1100,1900 TI   Administration





     





     





     





     


 


Dextrose/Water  1,000 mls @ 100 mls/hr  02/04/20 05:22  02/04/20 05:51





  D5w  IV   1,000 mls





  .Q10H TI   Administration





     





     





     





     


 


Etoposide 145 mg/ Sodium  507.25 mls @ 507.25 mls/hr  02/04/20 13:15  02/04/20 

15:29





  Chloride  IVPB  02/04/20 21:00  507.25 mls





  ONE TI   Administration





     





     





     





     


 


Magnesium Oxide  400 mg  02/02/20 09:00  02/04/20 14:34





  Magnesium Oxide  PO   Not Given





  BID TI   





     





     





     





     


 


Melatonin  6 mg  02/02/20 22:47  02/03/20 21:32





  Melatonin  PO   6 mg





  HSPRN PRN   Administration





  Insomnia   





     





     





     


 


Methylprednisolone Sodium Succinate  20 mg  02/03/20 14:00  02/04/20 13:00





  Solu-Medrol  IVP   20 mg





  Q8HR TI   Administration





     





     





     





     


 


Nicotine  7 mg  02/03/20 21:00  02/03/20 21:31





  Nicoderm Patch  TD   7 mg





  Q24HR TI   Administration





     





     





     





     


 


Palonosetron  0.25 mg  02/04/20 13:15  02/04/20 15:20





  Aloxi  IVP  02/04/20 21:00  0.25 mg





  ONE TI   Administration





     





     





     





     


 


Saccharomyces Boulardii  250 mg  02/02/20 09:00  02/04/20 14:34





  Florastor  PO   Not Given





  DAILY TI   





     





     





     





     


 


Sodium Chloride  10 ml  02/01/20 22:48  02/03/20 21:31





  Flush - Normal Saline  IVF   10 ml





  PRN PRN   Administration





  Saline Flush   





     





     





     














- Exam


General Appearance: awake alert, ill appearing


Eye: PERRL, anicteric sclera


ENT: no oropharyngeal lesions, dry oral mucosa


Neck: supple, no JVD


Heart: RRR, no murmur


Respiratory: no rales, rhonchi, wheezes


Gastrointestinal: soft, non-tender, non-distended, normal bowel sounds


Extremities - other findings: right LE shortened and in ext rotation


Neurological: cranial nerve grossly intact, no focal deficits





Hosp A/P


(1) small cell lung cancer with metastases


Status: Acute   





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


Status: Acute   





(3) Right femoral fracture


Code(s): S72.91XA - UNSP FRACTURE OF RIGHT FEMUR, INIT FOR CLOS FX   Status: 

Acute   


Qualifiers: 


   Encounter type: subsequent encounter   Femur location: intertrochanteric   

Fracture type: closed   Fracture alignment: displaced 





(4) multiple liver mets


Status: Acute   





(5) Chronic anemia


Code(s): D64.9 - ANEMIA, UNSPECIFIED   Status: Chronic   





(6) Tobacco abuse


Code(s): Z72.0 - TOBACCO USE   Status: Chronic   





(7) Alcohol use disorder, mild, abuse


Code(s): F10.10 - ALCOHOL ABUSE, UNCOMPLICATED   Status: Chronic   





(8) FTT (failure to thrive) in adult


Status: Acute   





- Plan





is on fluid restriction and tolvaptan for siadh, sodium better at 125.


poor prognosis with multiple mets from small cell lung cancer to occipital lobe

, parietal skull, multiple large liver mets, adrenal glands and abd lymph nodes 

as well.


nebs, steroids to perk her up a bit with hypoxia now


low dose fentanyl tts for hip fracture and cancer


d/w  extensively, pt is DNAR, he is aware of poor prognosis, their 

children will be arriving to see her (2/2/2020).


Palliative care consultation.


will start carboplatin based chemotherapy from today.

## 2020-02-05 LAB
ANION GAP SERPL CALC-SCNC: 12 MMOL/L (ref 10–20)
BUN SERPL-MCNC: 10 MG/DL (ref 9.8–20.1)
CALCIUM SERPL-MCNC: 8.8 MG/DL (ref 7.8–10.44)
CHLORIDE SERPL-SCNC: 86 MMOL/L (ref 98–107)
CO2 SERPL-SCNC: 28 MMOL/L (ref 23–31)
CREAT CL PREDICTED SERPL C-G-VRATE: 92 ML/MIN (ref 70–130)
GLUCOSE SERPL-MCNC: 136 MG/DL (ref 80–115)
POTASSIUM SERPL-SCNC: 4.6 MMOL/L (ref 3.5–5.1)
SODIUM SERPL-SCNC: 121 MMOL/L (ref 136–145)

## 2020-02-05 RX ADMIN — METRONIDAZOLE SCH MLS: 500 INJECTION, SOLUTION INTRAVENOUS at 03:47

## 2020-02-05 RX ADMIN — METRONIDAZOLE SCH MLS: 500 INJECTION, SOLUTION INTRAVENOUS at 19:59

## 2020-02-05 RX ADMIN — METRONIDAZOLE SCH MLS: 500 INJECTION, SOLUTION INTRAVENOUS at 11:23

## 2020-02-05 NOTE — PDOC.HOSPP
- Subjective


Encounter Date: 02/05/20


Encounter Time: 13:00


Subjective: 





is on bipap, awake, no pain





- Objective


Vital Signs & Weight: 


 Vital Signs (12 hours)











  Temp Pulse Resp Pulse Ox


 


 02/05/20 13:08   119 H  


 


 02/05/20 13:04   116 H  22 H  90 L


 


 02/05/20 11:20  98.4 F   


 


 02/05/20 07:44   125 H  


 


 02/05/20 07:43   126 H  43 H  89 L


 


 02/05/20 07:24  97.6 F   


 


 02/05/20 03:43  97.7 F   








 Weight











Weight                         109 lb 11.2 oz











 Most Recent Monitor Data











Heart Rate from ECG            120


 


NIBP                           112/70


 


NIBP BP-Mean                   84


 


Respiration from ECG           28


 


SpO2                           89














I&O: 


 











 02/04/20 02/05/20 02/06/20





 06:59 06:59 06:59


 


Intake Total 1811 481 


 


Output Total 2400 250 


 


Balance -589 231 











Result Diagrams: 


 02/03/20 03:36





 02/05/20 03:34





Hospitalist ROS





- Medication


Medications: 


Active Medications











Generic Name Dose Route Start Last Admin





  Trade Name Freq  PRN Reason Stop Dose Admin


 


Acetaminophen  650 mg  02/01/20 22:48  02/04/20 18:23





  Tylenol  PO   650 mg





  Q4H PRN   Administration





  Headache/Fever/Mild Pain (1-3)   





     





     





     


 


Albuterol/Ipratropium  3 ml  02/02/20 13:00  02/05/20 13:04





  Duoneb  NEB   3 ml





  [id number] TI   Administration





     





     





     





     


 


Famotidine  20 mg  02/02/20 09:00  02/05/20 07:50





  Pepcid  PO   Not Given





  BID TI   





     





     





     





     


 


Fentanyl  25 mcg  02/03/20 11:00  02/03/20 11:59





  Duragesic  TD   25 mcg





  Q3D TI   Administration





     





     





     





     


 


Levofloxacin 500 mg/ Device  100 mls @ 100 mls/hr  02/02/20 22:00  02/04/20 21:

13





  IVPB   100 mls





  Q24HR TI   Administration





     





     





     





     


 


Metronidazole 500 mg/ Device  100 mls @ 100 mls/hr  02/02/20 19:00  02/05/20 11:

23





  IVPB   100 mls





  0300,1100,1900 TI   Administration





     





     





     





     


 


Magnesium Oxide  400 mg  02/02/20 09:00  02/05/20 07:50





  Magnesium Oxide  PO   Not Given





  BID TI   





     





     





     





     


 


Melatonin  6 mg  02/02/20 22:47  02/04/20 21:22





  Melatonin  PO   6 mg





  HSPRN PRN   Administration





  Insomnia   





     





     





     


 


Menthol/Methyl Salicylate  120 gm  02/04/20 20:01  02/04/20 21:13





  Muscle Rub Cream (Bengay)  TOP   120 gm





  PRN PRN   Administration





  Muscle Pain   





     





     





     


 


Methylprednisolone Sodium Succinate  20 mg  02/03/20 14:00  02/05/20 06:02





  Solu-Medrol  IVP   20 mg





  Q8HR TI   Administration





     





     





     





     


 


Nicotine  7 mg  02/03/20 21:00  02/04/20 21:15





  Nicoderm Patch  TD   7 mg





  Q24HR IT   Administration





     





     





     





     


 


Saccharomyces Boulardii  250 mg  02/02/20 09:00  02/05/20 07:50





  Florastor  PO   Not Given





  DAILY TI   





     





     





     





     


 


Sodium Chloride  10 ml  02/01/20 22:48  02/04/20 21:14





  Flush - Normal Saline  IVF   10 ml





  PRN PRN   Administration





  Saline Flush   





     





     





     














- Exam


General Appearance: awake alert


Eye: PERRL, anicteric sclera


ENT: no oropharyngeal lesions, dry oral mucosa


Neck: supple, no JVD


Heart: RRR, no murmur


Respiratory: no wheezes, no rales


Gastrointestinal: soft, non-tender, non-distended, normal bowel sounds


Extremities: no cyanosis, 1+ LE edema


Extremities - other findings: right LE is short and in ext rotation


Neurological: cranial nerve grossly intact, no focal deficits





Hosp A/P


(1) small cell lung cancer with metastases


Status: Acute   





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


Status: Acute   





(3) Right femoral fracture


Code(s): S72.91XA - UNSP FRACTURE OF RIGHT FEMUR, INIT FOR CLOS FX   Status: 

Acute   


Qualifiers: 


   Encounter type: subsequent encounter   Femur location: intertrochanteric   

Fracture type: closed   Fracture alignment: displaced 





(4) multiple liver mets


Status: Acute   





(5) Chronic anemia


Code(s): D64.9 - ANEMIA, UNSPECIFIED   Status: Chronic   





(6) Tobacco abuse


Code(s): Z72.0 - TOBACCO USE   Status: Chronic   





(7) Alcohol use disorder, mild, abuse


Code(s): F10.10 - ALCOHOL ABUSE, UNCOMPLICATED   Status: Chronic   





(8) FTT (failure to thrive) in adult


Status: Acute   





- Plan





is on tolvaptan for siadh, sodium around 121.


poor prognosis with multiple mets from small cell lung cancer to occipital lobe

, parietal skull, multiple large liver mets, adrenal glands and abd lymph nodes 

as well.


nebs, steroids to perk her up a bit with hypoxia now


low dose fentanyl tts for hip fracture and cancer


d/w  extensively, pt is DNAR, he is aware of poor prognosis, their 

children will be arriving to see her (2/2/2020).


Palliative care consultation.


got 1 dose carboplatin 2/4/2020, now is on etoposide and atezolizumab.

## 2020-02-05 NOTE — PDOC.MOPN
Interval History: 





C1D2 of chemo. Tolerated chemo well yesterday w/o N/V/D or other problems. SOB 

is stable. She wants to get in a wheelchair and be pushed around.





- Vital Signs


Vital Signs: 


 Vital Signs (12 hours)











  Temp Pulse Resp Pulse Ox


 


 02/05/20 07:44   125 H  


 


 02/05/20 07:43   126 H  43 H  89 L


 


 02/05/20 07:24  97.6 F   


 


 02/05/20 03:43  97.7 F   


 


 02/04/20 23:35  97.4 F L   


 


 02/04/20 23:15   114 H  30 H  88 L








 Weight











Weight                         109 lb 11.2 oz











 Most Recent Monitor Data











Heart Rate from ECG            121


 


NIBP                           122/97


 


NIBP BP-Mean                   105


 


Respiration from ECG           35


 


SpO2                           77

















- Physical Exam


General: Alert, Oriented x3, Cooperative, Mild distress


Lungs: Other (diffuse rhonchi)


Cardiovascular: Regular rate


Abdomen: Soft


Neurological: Cranial nerves 3-12 NL


Psych/Mental Status: Mental status NL





- Labs


Result Diagrams: 


 02/03/20 03:36





 02/05/20 03:34


Lab results: 


 Laboratory Results - last 24 hr





02/05/20 03:34: Sodium 121 L, Potassium 4.6, Chloride 86 L, Carbon Dioxide 28, 

Anion Gap 12, BUN 10, Creatinine 0.44 L, Estimated GFR (MDRD) Greater than  90, 

Glucose 136 H, Calcium 8.8


02/04/20 09:00: Sodium 125 L, Potassium 3.9, Chloride 84 L, Carbon Dioxide 34 H

, Anion Gap 11, BUN 8 L, Creatinine 0.48 L, Estimated GFR (MDRD) Greater than  

90, Glucose 181 H, Calcium 9.1


02/01/20 18:35: Crossmatch See Detail











- Pathology


Pathology: 





Scalp Biopsy - Small Cell Carcinoma of Lung





A/P





- Problem


(1) Metastatic cancer


Current Visit: Yes   Code(s): C79.9 - SECONDARY MALIGNANT NEOPLASM OF 

UNSPECIFIED SITE   Status: Deleted   





(2) Right femoral fracture


Current Visit: Yes   Code(s): S72.91XA - UNSP FRACTURE OF RIGHT FEMUR, INIT FOR 

CLOS FX   Status: Acute   


Qualifiers: 


   Encounter type: subsequent encounter   Femur location: intertrochanteric   

Fracture type: closed   Fracture alignment: displaced 





(3) SIADH (syndrome of inappropriate ADH production)


Current Visit: Yes   Status: Acute   





- Plan


Plan: 





** C1D2 of Carboplatin + VP16, Days 1-3, followed by Fulphila on Day 4. May/may 

not receive Tecentriq in the hospital so will hopefully add with C2 if not


   ~~ Daily CBC, CMP


** trend sodium, should improve over time with treatment of SCLC


** Monitor brain lesions for now given asymptomatic and systemic disease more 

of a risk at this time


** prognosis guarded

## 2020-02-05 NOTE — OP
DATE OF PROCEDURE: 

2/3/20

 

SURGEON: Rosendo Adair PA-C.

 

PREOPERATIVE DIAGNOSIS: Right parietal scalp and skull lesion.

 

INDICATION: Histopathology biopsy for diagnostic purposes. 

 

PROCEDURE: Right parietal scalp lesion FNA and possible incision and open biopsy of mass. 

 

PROCEDURE IN DETAIL: Surgical site was identified to the right parietal  scalp, obvious 3 cm x 3 cm p
alpable mass to the right parietal scalp. This is soft in nature. The scalp was prepped and marked an
d infiltrated with 1% lidocaine with epinephrine 2 mL. The patient was draped out and two 22 gauge ne
edles were utilized for fine needle aspirate of the mass taking a transverse cross-section. The patie
nt had difficulties tolerating discomfort during this procedure and so we aborted the open biopsy. Mi
nimal blood loss.

## 2020-02-05 NOTE — PRG
DATE OF SERVICE:  02/05/2020



SUBJECTIVE:  Ms. Mayorga is a 69-year-old white female, followed up by the [facility]

[facility] for her hyponatremia secondary to SIADH.  Tolvaptan was placed on hold

yesterday.  However, serum sodium has worsened today at a value of 121.  Our plan is

to restart the tolvaptan at 15 mg tablet once a day.  She also has a small cell

carcinoma.  She has discussed with oncologist and wished to proceed with IV

chemotherapy.  She received etoposide recently. 



No new complaints.



OBJECTIVE:  VITAL SIGNS:  Blood pressure is 122/97 with heart rate of 126,

respiratory rate 43, O2 saturation 89%, currently on BiPAP. 

GENERAL:  Noted to be awake, in mild respiratory distress, currently on a BiPAP. 

SKIN:  Adequate turgor. 

HEENT:  She has pinkish conjunctivae.  Anicteric sclerae.  No neck mass.  No carotid

bruits.  No JVD. 

CHEST:  No deformities. 

LUNGS:  Decreased breath sounds. 

HEART:  Tachycardic.  No murmur.  No gallops.  No rubs. 

ABDOMEN:  Globular, soft, nontender. 

EXTREMITIES:  No edema.



MEDICATIONS:  Medications of February 5, 2020, was reviewed.



LABORATORY DATA:  Laboratories of February 3, 2020, white count 16.3, hemoglobin

10.1.  Sodium is noted at 121, potassium 4.6 chloride 86, carbon dioxide 28, BUN 10,

creatinine 0.44, calcium 8.8, glucose 136. 



ASSESSMENT AND PLAN:  

1. Hyponatremia secondary to SIADH.  We will restart back tolvaptan 15 mg tablet 

once a day.  No indication for any hypertonic saline at the present time.  Continue

free water restriction. 

2. Lung cancer-currently on chemotherapy.  Oncology is following.

3. Agree with current management.







Job ID:  [id number]

## 2020-02-06 LAB
ANION GAP SERPL CALC-SCNC: 13 MMOL/L (ref 10–20)
BUN SERPL-MCNC: 15 MG/DL (ref 9.8–20.1)
CALCIUM SERPL-MCNC: 9 MG/DL (ref 7.8–10.44)
CHLORIDE SERPL-SCNC: 89 MMOL/L (ref 98–107)
CO2 SERPL-SCNC: 31 MMOL/L (ref 23–31)
CREAT CL PREDICTED SERPL C-G-VRATE: 87 ML/MIN (ref 70–130)
GLUCOSE SERPL-MCNC: 130 MG/DL (ref 80–115)
POTASSIUM SERPL-SCNC: 4.9 MMOL/L (ref 3.5–5.1)
SODIUM SERPL-SCNC: 128 MMOL/L (ref 136–145)

## 2020-02-06 RX ADMIN — METRONIDAZOLE SCH MLS: 500 INJECTION, SOLUTION INTRAVENOUS at 20:01

## 2020-02-06 RX ADMIN — METRONIDAZOLE SCH MLS: 500 INJECTION, SOLUTION INTRAVENOUS at 11:03

## 2020-02-06 RX ADMIN — METRONIDAZOLE SCH MLS: 500 INJECTION, SOLUTION INTRAVENOUS at 04:20

## 2020-02-06 NOTE — PDOC.HOSPP
- Subjective


Encounter Date: 02/06/20


Encounter Time: 10:30


Subjective: 





awake, is on bipap


no pain today





- Objective


Vital Signs & Weight: 


 Vital Signs (12 hours)











  Temp Pulse Resp Pulse Ox


 


 02/06/20 16:26  96.1 F L   


 


 02/06/20 13:38   114 H  19  98


 


 02/06/20 12:00  97 F L   


 


 02/06/20 09:39     95


 


 02/06/20 08:08   118 H  


 


 02/06/20 08:07   121 H  27 H  99


 


 02/06/20 08:00     100


 


 02/06/20 07:43  97 F L   








 Weight











Weight                         109 lb 8 oz











 Most Recent Monitor Data











Heart Rate from ECG            117


 


NIBP                           100/70


 


NIBP BP-Mean                   80


 


Respiration from ECG           25


 


SpO2                           93














I&O: 


 











 02/05/20 02/06/20 02/07/20





 06:59 06:59 06:59


 


Intake Total 481 880 


 


Output Total 250 775 


 


Balance 231 105 











Result Diagrams: 


 02/03/20 03:36





 02/06/20 03:19





Hospitalist ROS





- Medication


Medications: 


Active Medications











Generic Name Dose Route Start Last Admin





  Trade Name Freq  PRN Reason Stop Dose Admin


 


Acetaminophen  650 mg  02/01/20 22:48  02/04/20 18:23





  Tylenol  PO   650 mg





  Q4H PRN   Administration





  Headache/Fever/Mild Pain (1-3)   





     





     





     


 


Albuterol/Ipratropium  3 ml  02/02/20 13:00  02/06/20 13:38





  Duoneb  NEB   3 ml





  [id number] TI   Administration





     





     





     





     


 


Dexamethasone  10 mg  02/06/20 14:00  02/06/20 13:52





  Decadron  SLOW IVP  02/06/20 21:00  10 mg





  ONE TI   Administration





     





     





     





     


 


Famotidine  20 mg  02/02/20 09:00  02/06/20 07:21





  Pepcid  PO   Not Given





  BID TI   





     





     





     





     


 


Levofloxacin 500 mg/ Device  100 mls @ 100 mls/hr  02/02/20 22:00  02/05/20 20:

56





  IVPB   100 mls





  Q24HR TI   Administration





     





     





     





     


 


Etoposide 145 mg/ Sodium  507.25 mls @ 507.25 mls/hr  02/06/20 14:00  02/06/20 

13:55





  Chloride  IVPB  02/06/20 21:00  507.25 mls





  ONE TI   Administration





     





     





     





     


 


Metronidazole 500 mg/ Device  100 mls @ 100 mls/hr  02/05/20 20:00  02/06/20 11:

03





  IVPB   100 mls





  0400,1200,2000 TI   Administration





     





     





     





     


 


Magnesium Oxide  400 mg  02/02/20 09:00  02/06/20 07:21





  Magnesium Oxide  PO   Not Given





  BID TI   





     





     





     





     


 


Melatonin  6 mg  02/02/20 22:47  02/04/20 21:22





  Melatonin  PO   6 mg





  HSPRN PRN   Administration





  Insomnia   





     





     





     


 


Menthol/Methyl Salicylate  120 gm  02/04/20 20:01  02/04/20 21:13





  Muscle Rub Cream (Bengay)  TOP   120 gm





  PRN PRN   Administration





  Muscle Pain   





     





     





     


 


Nicotine  7 mg  02/03/20 21:00  02/05/20 20:03





  Nicoderm Patch  TD   7 mg





  Q24HR TI   Administration





     





     





     





     


 


Saccharomyces Boulardii  250 mg  02/02/20 09:00  02/06/20 07:21





  Florastor  PO   Not Given





  DAILY TI   





     





     





     





     


 


Sodium Chloride  10 ml  02/01/20 22:48  02/04/20 21:14





  Flush - Normal Saline  IVF   10 ml





  PRN PRN   Administration





  Saline Flush   





     





     





     


 


Tolvaptan  15 mg  02/06/20 09:00  02/06/20 07:22





  Samsca  PO   Not Given





  DAILY TI   





     





     





     





     














- Exam


General Appearance: awake alert


Eye: PERRL, anicteric sclera


ENT: no oropharyngeal lesions, dry oral mucosa


Neck: supple, no JVD


Heart: RRR, no murmur


Respiratory: no wheezes, no rales, rhonchi


Gastrointestinal: soft, non-tender, non-distended, normal bowel sounds


Extremities: no edema


Extremities - other findings: right LE is short and ext rotated


Neurological: cranial nerve grossly intact, no focal deficits





Hosp A/P


(1) small cell lung cancer with metastases


Status: Acute   





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


Status: Acute   





(3) Right femoral fracture


Code(s): S72.91XA - UNSP FRACTURE OF RIGHT FEMUR, INIT FOR CLOS FX   Status: 

Acute   


Qualifiers: 


   Encounter type: subsequent encounter   Femur location: intertrochanteric   

Fracture type: closed   Fracture alignment: displaced 





(4) multiple liver mets


Status: Acute   





(5) Chronic anemia


Code(s): D64.9 - ANEMIA, UNSPECIFIED   Status: Chronic   





(6) Tobacco abuse


Code(s): Z72.0 - TOBACCO USE   Status: Chronic   





(7) Alcohol use disorder, mild, abuse


Code(s): F10.10 - ALCOHOL ABUSE, UNCOMPLICATED   Status: Chronic   





(8) FTT (failure to thrive) in adult


Status: Acute   





- Plan





is on tolvaptan for siadh, sodium around 128.


poor prognosis with multiple mets from small cell lung cancer to occipital lobe

, parietal skull, multiple large liver mets, adrenal glands and abd lymph nodes 

as well.


nebs, steroids to perk her up a bit with hypoxia now


low dose fentanyl tts for hip fracture and cancer


d/w  extensively, pt is DNAR, he is aware of poor prognosis, their 

children will be arriving to see her (2/2/2020).


Palliative care consultation.


got 1 dose carboplatin 2/4/2020, now is on etoposide and atezolizumab.


oral feeding between bipap breaks.

## 2020-02-06 NOTE — PRG
DATE OF SERVICE:  02/05/2020



SERVICE:  Pulmonary Medicine.



INTERVAL HISTORY:  The patient has had chemotherapy on 2 separate occasions at this

point.  That being said, her oxygen requirements remain quite elevated.  Otherwise,

there has been no interval change to her condition.  She specifically denies fevers

or chills overnight.  She indicates that she is not having much in the way of

breathing difficulty, though any interruption in BiPAP causes very severe

desaturations.  She is on 90% FiO2 and her saturations are only marginal.  That

being said, we hope that she is going to have a robust recovery once the

chemotherapy has a chance to kick in. 



PHYSICAL EXAMINATION:

VITAL SIGNS:  Afebrile, pulse 132, blood pressure 118/66, respirations 25,

saturation 93%, currently on BiPAP with 90% FiO2. 

HEENT:  Normocephalic and atraumatic.  Sclerae white.  Conjunctivae pink.  Oral

mucosa is moist without lesions. 

LUNGS:  Wonderful air entry bilaterally.  No crackles or rhonchi appreciated. 

HEART:  Normal rate.  Regular. 

ABDOMEN:  Soft, nontender, and nondistended.  Bowel sounds are positive. 

MUSCULOSKELETAL:  No cyanosis or clubbing.  There is diffuse 1 to 2+ pitting

throughout.  However, she has signs of significant volume reduction recently. 



LABORATORY DATA:  Sodium 121, potassium 4.6, chloride 89.  Basic metabolic profile

is otherwise unremarkable.  Blood cultures x2, influenza A and B, and urine culture

are all negative to date. 



ASSESSMENT:  

1. Acute hypoxic respiratory failure.

2. Small cell carcinoma, status post 2 days of chemotherapy.

3. Hyponatremia secondary to syndrome of inappropriate antidiuretic hormone

secretion, improving. 

4. Mechanical fall secondary to these medical issues resulting in left-sided

intertrochanteric hip fracture (pathologic fracture not excluded). 



DISCUSSION AND PLAN:  We will continue support on the BiPAP.  Hopefully, once the

small cell lung cancer melts away, her oxygen requirements will dramatically

improve.  At this point, she remains a firm DNI/DNR.  Pulmonary will continue to

follow in this location, but at this point, she will need to stay in the IM. 







Job ID:  [id number]

## 2020-02-06 NOTE — PDOC.MOPN
Interval History: 





doing ok, tolerated less FiO2 on BPAP





- Vital Signs


Vital Signs: 


 Vital Signs (12 hours)











  Temp Pulse Resp Pulse Ox


 


 02/06/20 08:08   118 H  


 


 02/06/20 08:07   121 H  27 H  99


 


 02/06/20 07:43  97 F L   


 


 02/06/20 03:50  97.5 F L   


 


 02/05/20 23:46   119 H  16  96


 


 02/05/20 23:25  97.4 F L   








 Weight











Weight                         109 lb 8 oz











 Most Recent Monitor Data











Heart Rate from ECG            116


 


NIBP                           107/74


 


NIBP BP-Mean                   91


 


Respiration from ECG           23


 


SpO2                           100

















- Physical Exam


General: Alert


HEENT: Atraumatic


Lungs: Other (crackles)


Cardiovascular: Other (tachy)


Abdomen: Normal bowel sounds, Soft, No tenderness, No hepatospenomegaly, No 

masses


Extremities: Other (1+ BLE edema)


Neurological: Normal speech


Psych/Mental Status: Mental status NL





- Labs


Result Diagrams: 


 02/03/20 03:36





 02/06/20 03:19


Lab results: 


 Laboratory Results - last 24 hr





02/06/20 03:19: Sodium 128 L, Potassium 4.9, Chloride 89 L, Carbon Dioxide 31, 

Anion Gap 13, BUN 15, Creatinine 0.48 L, Estimated GFR (MDRD) Greater than  90, 

Glucose 130 H, Calcium 9.0








Status: lab reviewed by me





A/P





- Problem


(1) Acute respiratory failure with hypoxia


Current Visit: Yes   Code(s): J96.01 - ACUTE RESPIRATORY FAILURE WITH HYPOXIA   

Status: Acute   





(2) small cell lung cancer with metastases


Current Visit: Yes   Status: Acute   





- Plan


Plan: 





** C1D3 of Carboplatin + VP16, Days 1-3, followed by Fulphila on Day 4. May/may 

not receive Tecentriq in the hospital so will hopefully add with C2 if not


   ~~ Daily CBC, CMP


** trend sodium, should improve over time with treatment of SCLC


** Monitor brain lesions for now given asymptomatic and systemic disease more 

of a risk at this time


** prognosis guarded

## 2020-02-06 NOTE — PRG
DATE OF SERVICE:  02/06/2020



SERVICE:  Nephrology.



SUBJECTIVE:  A 69-year-old female with metastatic small cell cancer of the lungs,

seen in followup for hyponatremia.  The patient's respiratory status worsened and

she is currently getting continues BiPAP treatment.  The patient is day 2.  The

patient is currently getting chemotherapy for small-cell lung cancer. 



No new problems.  Denied nausea, vomiting.  Oral intake has been pretty much

nonexistent and the patient was started on IV normal saline earlier today at 100

mL/hours. 



OBJECTIVE:  VITAL SIGNS:  Temperature 97, pulse 119, respiratory rate 16, SpO2 of

94% on BiPAP.  Blood pressure is 103/67. 

GENERAL:  Chronically ill-looking female, in some respiratory distress. 

HEENT:  Normocephalic, atraumatic.  BiPAP is in place. 

CARDIOVASCULAR:  Regular rhythm, but tachycardic. 

RESPIRATORY:  Fair air entry bilaterally with scattered crackles and transmitted

breath sounds.  Work of breathing is increased. 

GI:  Full, soft, nontender, nondistended with normal bowel sounds. 

EXTREMITIES:  Right lower extremity is held in lateral rotation with some swelling

of the __________ of the thigh. 

CNS:  The patient is sleepy, but easily arousable.  Oriented to person at least.



DIAGNOSTIC DATA:  BMP today showed sodium 128, potassium 4.9, chloride 89, CO2 of

31, BUN 15, creatinine 0.48, glucose 130, calcium 9.0. 



ASSESSMENT:  

1. Hyponatremia:  Due to syndrome of inappropriate antidiuretic hormone.

2. On admission, serum sodium was 111 and this has improved to 128 currently.  The

patient is n.p.o. currently, was started on normal saline.  It is anticipated that

this might reduce the serum sodium.  The patient also is on Samsca. 

3. Metastatic lung cancer of small cell type.  Started on chemotherapy.  The patient

is at increased risk of tumor lysis syndrome. 

4. Sinus tachycardia:  Most likely due to volume contraction from poor oral intake.

5. Right hip fracture.

6. Protein-calorie malnutrition.

7. Acute respiratory failure with hypoxia:  The patient is BiPAP dependent.  The

patient is do not resuscitate, hence not intubated. 



PLAN:  

1. We will continue Samsca (tolvaptan) 15 mg daily.

2. We will recheck BMP in the morning.

3. I agree with IV fluid therapy as the patient is n.p.o. due to respiratory

distress. 

4. We will also get magnesium and phosphorus as well as uric acid in the morning as

patient is at increased risk of tumor lysis syndrome. 

5. Nutritional rehabilitation is recommended either by parenteral or enteral route.

Case was discussed with primary attending.  We will continue to follow.  Further

treatment to follow depending on hospital course. 







Job ID:  [id number]

## 2020-02-06 NOTE — PRG
DATE OF SERVICE:  02/06/2020



SERVICE:  Pulmonary Medicine.



INTERVAL HISTORY:  The patient is improving a little bit from a respiratory

standpoint.  Her oxygen requirements on BiPAP have improved to about 60% FiO2.  She

cannot provide much in the way of interval history.  She is little bit more

somnolent today than yesterday.  She is tolerating chemotherapy so far quite well.

Otherwise, there has been no interval change to her condition. 



PHYSICAL EXAMINATION:

VITAL SIGNS:  Afebrile; pulse 119; blood pressure 103/67; respirations 16;

saturation 94%, currently on 70% FiO2 delivered via BiPAP. 

HEENT:  Normocephalic and atraumatic.  Sclerae are white.  Conjunctivae are pink.

Oral mucosa is moist without lesions. 

LUNGS:  Good air entry bilaterally.  There are rhonchi on the right.  No prolonged

expiratory phase or wheezing is appreciated. 

HEART:  Normal rate.  Regular. 

ABDOMEN:  Soft, nontender, and nondistended.  Bowel sounds are positive. 

MUSCULOSKELETAL:  No cyanosis or clubbing.  There is no pitting in bilateral lower

extremities. 

NEUROLOGIC:  Grossly nonfocal.  She demonstrates a little bit more encephalopathy

today than prior.  She is also touch more sleepy. 



LABORATORY DATA:  WBC 16.3, hemoglobin 10.1 and stable, and platelets 226,000.

Sodium 128 and gently up-trending.  Basic metabolic profile is otherwise

unremarkable.  Chloride continues to improve as well.  Urinalysis is negative.

Blood cultures x2, influenza, and urinalysis is negative. 



ASSESSMENT:  

1. Acute hypoxic respiratory failure.

2. Small cell lung cancer, widely metastatic.

3. Hyponatremia secondary to syndrome of inappropriate antidiuretic hormone

secretion, improving. 

4. Mechanical fall secondary to these issues resulting in right-sided

intertrochanteric hip fracture (pathologic fracture not excluded). 



DISCUSSION AND PLAN:  Her oxygen requirements are improving ever so slightly.  As

such, we are going to start giving her breaks off her BiPAP 3 times daily and

increase as tolerated.  She remains a firm DNR.  Hopefully, we will be able to get

through this process without significant respiratory event.  If she does have

respiratory event, we have firmed with the patient that we are not to be aggressive

with intubations or chest compressions. 







Job ID:  [id number]

## 2020-02-07 LAB
ALBUMIN SERPL BCG-MCNC: 3 G/DL (ref 3.4–4.8)
ALP SERPL-CCNC: 75 U/L (ref 40–110)
ALT SERPL W P-5'-P-CCNC: 23 U/L (ref 8–55)
ANION GAP SERPL CALC-SCNC: 10 MMOL/L (ref 10–20)
AST SERPL-CCNC: 20 U/L (ref 5–34)
BILIRUB SERPL-MCNC: 0.5 MG/DL (ref 0.2–1.2)
BUN SERPL-MCNC: 16 MG/DL (ref 9.8–20.1)
CALCIUM SERPL-MCNC: 8.7 MG/DL (ref 7.8–10.44)
CHLORIDE SERPL-SCNC: 94 MMOL/L (ref 98–107)
CO2 SERPL-SCNC: 36 MMOL/L (ref 23–31)
CREAT CL PREDICTED SERPL C-G-VRATE: 95 ML/MIN (ref 70–130)
GLOBULIN SER CALC-MCNC: 2 G/DL (ref 2.4–3.5)
GLUCOSE SERPL-MCNC: 123 MG/DL (ref 80–115)
HGB BLD-MCNC: 10 G/DL (ref 12–16)
MAGNESIUM SERPL-MCNC: 2.3 MG/DL (ref 1.6–2.6)
MCH RBC QN AUTO: 33.7 PG (ref 27–31)
MCV RBC AUTO: 107 FL (ref 78–98)
MDIFF COMPLETE?: YES
PLATELET # BLD AUTO: 191 THOU/UL (ref 130–400)
POTASSIUM SERPL-SCNC: 5.4 MMOL/L (ref 3.5–5.1)
RBC # BLD AUTO: 2.98 MILL/UL (ref 4.2–5.4)
SODIUM SERPL-SCNC: 135 MMOL/L (ref 136–145)
URATE SERPL-MCNC: 6 MG/DL (ref 2.6–6)
WBC # BLD AUTO: 21.8 THOU/UL (ref 4.8–10.8)

## 2020-02-07 RX ADMIN — METRONIDAZOLE SCH MLS: 500 INJECTION, SOLUTION INTRAVENOUS at 20:41

## 2020-02-07 RX ADMIN — METRONIDAZOLE SCH MLS: 500 INJECTION, SOLUTION INTRAVENOUS at 04:35

## 2020-02-07 RX ADMIN — METRONIDAZOLE SCH MLS: 500 INJECTION, SOLUTION INTRAVENOUS at 13:25

## 2020-02-07 NOTE — PRG
DATE OF SERVICE:  02/07/2020



SERVICE:  Pulmonary Medicine.



INTERVAL HISTORY:  The patient is doing okay from respiratory standpoint.  On BiPAP,

she has been weaned down to 60% FiO2 and tolerating it just fine.  She denies any

current shortness of breath and is actually talking in full sentences.  She appears

to have slightly improved strength today. 



PHYSICAL EXAMINATION:

VITAL SIGNS:  Afebrile, pulse 115, blood pressure 126/87, respirations 19,

saturation 97%, currently on 60% FiO2 delivered via BiPAP. 

GENERAL:  The patient is awake and alert, in no apparent distress. 

LUNGS:  Decent air entry.  Crackles are present.  No prolonged expiratory phase

appreciated. 

HEART:  Normal rate.  Regular. 

ABDOMEN:  Soft, nontender, and nondistended.  Bowel sounds are positive. 

MUSCULOSKELETAL:  No cyanosis or clubbing.  No pitting in the bilateral lower

extremities. 

NEUROLOGIC:  Grossly nonfocal.



LABORATORY DATA:  WBC 21.8, hemoglobin 10.0, and platelets 191,000.  INR 1.2.

Potassium 5.4 and gently uptrending.  Phosphorus 3.4.  Liver function studies are

otherwise unremarkable.  Bicarb is trended up to 36.  Urinalysis is unremarkable.

Influenza A and B, urine culture, and blood cultures x2 are unremarkable. 



IMAGING STUDIES:  Chest x-ray demonstrates bilateral perihilar airspace disease and

effusions. 



ASSESSMENT:  

1. Acute hypoxic respiratory failure.

2. Small-cell lung cancer, widely metastatic.

3. Hyponatremia secondary to syndrome of inappropriate antidiuretic hormone,

improving. 

4. Mechanical fall secondary to these medical issues, resulting in right-sided

intertrochanteric hip fracture (pathologic fracture not excluded). 



DISCUSSION AND PLAN:  The patient is volume up during this hospital stay.  I will

give her a dose of Lasix and continue her nebulized medications, antibiotics, and

steroids.  We will continue to try to give her breaks off the BiPAP 3 times daily on

to high-flow nasal cannula and increase as tolerated.  Pulmonary/Critical Care will

continue to follow closely. 







Job ID:  [id number]

## 2020-02-07 NOTE — RAD
PORTABLE CHEST:

 

Date:  02/07/2020

 

PROVIDED CLINICAL HISTORY:   

Mass and hypoxia. 

 

FINDINGS:

 

Comparison made with the study dated 02/01/2020. 

 

Cardiac and mediastinal silhouette is unchanged in appearance, with mediastinal mass redemonstrated. 
There is interval development of bilateral perihilar air space disease and prominence of the pulmonar
y vasculature. Bilateral pleural effusions are suspected. There is no evidence for pneumothorax. 

 

IMPRESSION: 

Development of bilateral perihilar air space disease and bilateral pleural effusions. Findings may re
flect congestive failure and pulmonary edema. Aspiration or pneumonia could also be considered. Follo
w-up is recommended. 

 

POS: OFF

## 2020-02-07 NOTE — PRG
DATE OF SERVICE:  02/07/2020



SERVICE:  Nephrology.



SUBJECTIVE:  A 69-year-old female with metastatic lung cancer, seen in followup for

hyponatremia.  The patient is more awake today.  She is, however, still BiPAP

dependent.  No nausea or vomiting.  Oral intake remained little or nothing due to

continued BiPAP use.  She was started on IV fluid yesterday. 



OBJECTIVE:  VITAL SIGNS:  Temperature 97.8, pulse 115, respiratory rate 19, SpO2 of

97% on BiPAP, and blood pressure is 126/87. 

GENERAL:  Chronically ill-looking female, in mild respiratory distress.  Afebrile,

anicteric, and acyanotic. 

HEENT:  Normocephalic and atraumatic.  BiPAP mask is in place. 

NECK:  No JVD appreciated. 

CARDIOVASCULAR:  Regular rhythm and rate, but tachycardic. 

RESPIRATORY:  Decreased air entry on both bases.  Otherwise, fair air entry with a

few transmitted breath sounds and some crackles.  No rhonchi were appreciated.  Work

of breathing is mildly increased. 

GI:  Full, soft, nontender, and nondistended with normal bowel sounds. 

EXTREMITIES:  Right lower extremity is laterally rotated with some tenderness at the

thigh.  No obvious edema is appreciated on both lower extremities.  Mild bilateral

upper extremity edema noted. 

CNS:  The patient is sleeping, but easily arousable.  Oriented x3 with appropriate

mental status.  Cranial nerves 2 through 12 are grossly intact. 



DIAGNOSTIC DATA:  CBC showed WBC count of 21, hemoglobin of 10.0, platelets of 191. 



BMP showed sodium 135, potassium 5.4, chloride 94, CO2 of 36, BUN 16, creatinine

0.44, and glucose 123.  Uric acid 6, calcium 8.7, phosphorus 3.4, and magnesium 2.3.

 Total bilirubin 0.5, AST 20, ALT 23, and alkaline phosphatase 75.  Total protein

5.0, albumin 3.0, and globulin 2.0. 



ASSESSMENT:  

1. Hyponatremia:  Due to syndrome of inappropriate antidiuretic hormone with

possible contribution from poor solute intake.  Sodium is up to 135 following

admission of Samsca.  However, with use of IV fluids, sodium did not drop, making

contribution from poor solute intake and volume contraction possible. 

2. Hyperkalemia:  Most likely due to tumor lysis syndrome given small-cell lung

cancer on chemotherapy. 

3. Intravascular contraction with contraction alkalosis.

4. Metastatic small-cell lung cancer with metastasis to the brain and liver.  Status

post chemotherapy.  The patient is at high risk for tumor lysis syndrome. 

5. Protein-calorie malnutrition.

6. Right hip fracture.

7. Respiratory failure, requiring oxygen and BiPAP due to chronic obstructive

pulmonary disease as well as obstructive pneumonia. 



PLAN:  

1. We will increase IV fluid therapy to 150 mL/h.  We will also give the patient a

dose of Kayexalate given hyperkalemia. 

2. Nutritional rehabilitation is recommended and if respiratory status could permit,

recommencement of oral intake will be helpful. 

3. We will continue to monitor electrolytes within high risk for tumor lysis

syndrome.  Further treatment to follow depending on hospital course. 







Job ID:  [id number]

## 2020-02-07 NOTE — PDOC.HOSPP
- Subjective


Encounter Date: 02/07/20


Encounter Time: 07:45


Subjective: 





is on bipap, awake, tries to respond to verbal stimuli





- Objective


Vital Signs & Weight: 


 Vital Signs (12 hours)











  Temp Pulse Resp Pulse Ox


 


 02/07/20 13:23   120 H  


 


 02/07/20 11:51  96.8 F L   


 


 02/07/20 08:00     95


 


 02/07/20 07:37  97.8 F   


 


 02/07/20 07:18   113 H  


 


 02/07/20 07:16   113 H  30 H  94 L


 


 02/07/20 03:36  98.2 F   


 


 02/07/20 02:14   113 H  








 Weight











Weight                         110 lb 3.2 oz











 Most Recent Monitor Data











Heart Rate from ECG            112


 


NIBP                           99/86


 


NIBP BP-Mean                   90


 


Respiration from ECG           7


 


SpO2                           84














I&O: 


 











 02/06/20 02/07/20 02/08/20





 06:59 06:59 06:59


 


Intake Total 880 1720 


 


Output Total 775 1075 


 


Balance 105 645 











Result Diagrams: 


 02/07/20 05:49





 02/07/20 05:49





Hospitalist ROS





- Medication


Medications: 


Active Medications











Generic Name Dose Route Start Last Admin





  Trade Name Freq  PRN Reason Stop Dose Admin


 


Acetaminophen  650 mg  02/01/20 22:48  02/04/20 18:23





  Tylenol  PO   650 mg





  Q4H PRN   Administration





  Headache/Fever/Mild Pain (1-3)   





     





     





     


 


Albuterol/Ipratropium  3 ml  02/02/20 13:00  02/07/20 07:16





  Duoneb  NEB   3 ml





  E2AI-EV TI   Administration





     





     





     





     


 


Dexamethasone  4 mg  02/06/20 21:00  02/07/20 09:17





  Decadron  SLOW IVP   4 mg





  BID TI   Administration





     





     





     





     


 


Famotidine  20 mg  02/02/20 09:00  02/07/20 13:22





  Pepcid  PO   Not Given





  BID TI   





     





     





     





     


 


Levofloxacin 500 mg/ Device  100 mls @ 100 mls/hr  02/02/20 22:00  02/06/20 21:

06





  IVPB   100 mls





  Q24HR TI   Administration





     





     





     





     


 


Metronidazole 500 mg/ Device  100 mls @ 100 mls/hr  02/05/20 20:00  02/07/20 13:

25





  IVPB   100 mls





  0400,1200,2000 TI   Administration





     





     





     





     


 


Sodium Chloride  1,000 mls @ 50 mls/hr  02/07/20 11:15  02/07/20 13:28





  1/2 Normal Saline  IV   1,000 mls





  .Q20H TI   Administration





     





     





     





     


 


Melatonin  6 mg  02/02/20 22:47  02/04/20 21:22





  Melatonin  PO   6 mg





  HSPRN PRN   Administration





  Insomnia   





     





     





     


 


Menthol/Methyl Salicylate  120 gm  02/04/20 20:01  02/04/20 21:13





  Muscle Rub Cream (Bengay)  TOP   120 gm





  PRN PRN   Administration





  Muscle Pain   





     





     





     


 


Nicotine  7 mg  02/03/20 21:00  02/06/20 21:06





  Nicoderm Patch  TD   7 mg





  Q24HR TI   Administration





     





     





     





     


 


Saccharomyces Boulardii  250 mg  02/02/20 09:00  02/07/20 13:23





  Florastor  PO   Not Given





  DAILY TI   





     





     





     





     


 


Sodium Chloride  10 ml  02/01/20 22:48  02/04/20 21:14





  Flush - Normal Saline  IVF   10 ml





  PRN PRN   Administration





  Saline Flush   





     





     





     














- Exam


General Appearance: awake alert


Eye: PERRL, anicteric sclera


ENT: no oropharyngeal lesions, moist mucosa


Neck: supple, no JVD


Heart: RRR, no murmur


Respiratory: no wheezes, rales, rhonchi


Gastrointestinal: soft, non-tender, non-distended, normal bowel sounds


Extremities: no cyanosis, 1+ LE edema


Neurological: cranial nerve grossly intact, no new deficit





Hosp A/P


(1) small cell lung cancer with metastases


Status: Acute   





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


Status: Acute   





(3) Right femoral fracture


Code(s): S72.91XA - UNSP FRACTURE OF RIGHT FEMUR, INIT FOR CLOS FX   Status: 

Acute   


Qualifiers: 


   Encounter type: subsequent encounter   Femur location: intertrochanteric   

Fracture type: closed   Fracture alignment: displaced 





(4) multiple liver mets


Status: Acute   





(5) Chronic anemia


Code(s): D64.9 - ANEMIA, UNSPECIFIED   Status: Chronic   





(6) Tobacco abuse


Code(s): Z72.0 - TOBACCO USE   Status: Chronic   





(7) Alcohol use disorder, mild, abuse


Code(s): F10.10 - ALCOHOL ABUSE, UNCOMPLICATED   Status: Chronic   





(8) FTT (failure to thrive) in adult


Status: Acute   





- Plan





off tolvaptan for siadh, sodium around 134.


poor prognosis with multiple mets from small cell lung cancer to occipital lobe

, parietal skull, multiple large liver mets, adrenal glands and abd lymph nodes 

as well.


nebs, steroids


low dose fentanyl tts for hip fracture and cancer


d/w  extensively, pt is DNAR, he is aware of poor prognosis, their 

children will be arriving to see her (2/2/2020).


Palliative care consultation.


got 1 dose carboplatin 2/4/2020, etoposide and atezolizumab first cycle is 

completed 2/6/2020.


suggest oral feeding between bipap breaks.

## 2020-02-07 NOTE — PDOC.MOPN
Interval History: 





alert, remains on bpap at 65% fiO2





- Vital Signs


Vital Signs: 


 Vital Signs (12 hours)











  Temp Pulse Resp Pulse Ox


 


 02/07/20 13:23   120 H  


 


 02/07/20 11:51  96.8 F L   


 


 02/07/20 08:00     95


 


 02/07/20 07:37  97.8 F   


 


 02/07/20 07:18   113 H  


 


 02/07/20 07:16   113 H  30 H  94 L


 


 02/07/20 03:36  98.2 F   








 Weight











Weight                         110 lb 3.2 oz











 Most Recent Monitor Data











Heart Rate from ECG            112


 


NIBP                           99/86


 


NIBP BP-Mean                   90


 


Respiration from ECG           7


 


SpO2                           84

















- Physical Exam


General: Alert


HEENT: Atraumatic, PERRLA, EOMI, Mucous membr. moist/pink


Lungs: Other (wheezes, crackles)


Cardiovascular: Other (tachycardia)


Abdomen: Normal bowel sounds, Soft, No tenderness, No hepatospenomegaly, No 

masses


Extremities: Other


Skin: No rashes, No breakdown, No significant lesion


Neurological: Normal speech





- Labs


Result Diagrams: 


 02/07/20 05:49





 02/07/20 05:49


Lab results: 


 Laboratory Results - last 24 hr





02/07/20 05:49: Phosphorus 3.4


02/07/20 05:49: WBC 21.8 H, RBC 2.98 L, Hgb 10.0 L, Hct 31.9 L, .0 H, 

MCH 33.7 H, MCHC 31.4 L, RDW 12.2, Plt Count 191, MPV 6.9 L, Neutrophils % (

Manual) 63, Band Neuts % (Manual) 37 H, Plt Morphology Comment Appears Adequate


02/07/20 05:49: Sodium 135 L, Potassium 5.4 H, Chloride 94 L, Carbon Dioxide 36 

H, Anion Gap 10, BUN 16, Creatinine 0.44 L, Estimated GFR (MDRD) Greater than  

90, Glucose 123 H, Uric Acid 6.0, Calcium 8.7, Magnesium 2.3, Total Bilirubin 

0.5, AST 20, ALT 23, Alkaline Phosphatase 75, Serum Total Protein 5.0 L, 

Albumin 3.0 L, Globulin 2.0 L, Albumin/Globulin Ratio 1.5








Status: lab reviewed by me





A/P





- Problem


(1) Acute respiratory failure with hypoxia


Current Visit: Yes   Code(s): J96.01 - ACUTE RESPIRATORY FAILURE WITH HYPOXIA   

Status: Acute   





(2) small cell lung cancer with metastases


Current Visit: Yes   Status: Acute   





- Plan


Plan: 





** C1D4 of Carboplatin + VP16, Days 1-3, followed by Fulphila on Day 4. May/may 

not receive Tecentriq in the hospital so will hopefully add with C2 if not


   ~~ Daily CBC, CMP


** trend sodium, should improve over time with treatment of SCLC


** Monitor brain lesions for now given asymptomatic and systemic disease more 

of a risk at this time


** prognosis guarded

## 2020-02-08 LAB
ALBUMIN SERPL BCG-MCNC: 2.7 G/DL (ref 3.4–4.8)
ALP SERPL-CCNC: 71 U/L (ref 40–110)
ALT SERPL W P-5'-P-CCNC: 17 U/L (ref 8–55)
ANION GAP SERPL CALC-SCNC: 13 MMOL/L (ref 10–20)
AST SERPL-CCNC: 20 U/L (ref 5–34)
BILIRUB SERPL-MCNC: 0.5 MG/DL (ref 0.2–1.2)
BUN SERPL-MCNC: 17 MG/DL (ref 9.8–20.1)
CALCIUM SERPL-MCNC: 8.3 MG/DL (ref 7.8–10.44)
CHLORIDE SERPL-SCNC: 94 MMOL/L (ref 98–107)
CO2 SERPL-SCNC: 34 MMOL/L (ref 23–31)
CREAT CL PREDICTED SERPL C-G-VRATE: 102 ML/MIN (ref 70–130)
GLOBULIN SER CALC-MCNC: 1.8 G/DL (ref 2.4–3.5)
GLUCOSE SERPL-MCNC: 119 MG/DL (ref 80–115)
HGB BLD-MCNC: 9.2 G/DL (ref 12–16)
MACROCYTES BLD QL SMEAR: (no result) (100X)
MAGNESIUM SERPL-MCNC: 2.2 MG/DL (ref 1.6–2.6)
MCH RBC QN AUTO: 35.3 PG (ref 27–31)
MCV RBC AUTO: 107 FL (ref 78–98)
MDIFF COMPLETE?: YES
PLATELET # BLD AUTO: 113 THOU/UL (ref 130–400)
POTASSIUM SERPL-SCNC: 4.9 MMOL/L (ref 3.5–5.1)
RBC # BLD AUTO: 2.59 MILL/UL (ref 4.2–5.4)
SODIUM SERPL-SCNC: 136 MMOL/L (ref 136–145)
URATE SERPL-MCNC: 5.7 MG/DL (ref 2.6–6)
WBC # BLD AUTO: 36 THOU/UL (ref 4.8–10.8)

## 2020-02-08 RX ADMIN — METRONIDAZOLE SCH MLS: 500 INJECTION, SOLUTION INTRAVENOUS at 04:06

## 2020-02-08 RX ADMIN — METRONIDAZOLE SCH MLS: 500 INJECTION, SOLUTION INTRAVENOUS at 12:08

## 2020-02-08 RX ADMIN — METRONIDAZOLE SCH MLS: 500 INJECTION, SOLUTION INTRAVENOUS at 20:08

## 2020-02-08 NOTE — PRG
DATE OF SERVICE:  02/08/2020



SUBJECTIVE:  This morning, she is back on a BiPAP.  She barely tolerated being off

for more than an hour.  Oncologist saw her.  She has small-cell lung cancer with

METS.  She had apparently chemotherapy done. 



She is weak.



OBJECTIVE:  VITAL SIGNS:  Temperature 97, pulse 120, __________, sats are 92%, and

blood pressure 107/63. 

CHEST:  Extensive rhonchi and crackles. 

CARDIAC:  Sinus tach. 

ABDOMEN:  Soft.



LABORATORY DATA:  Her creatinine is normal.  Albumin is low at 2.7.  White count

36,000.  Last chest x-ray showed bilateral infiltrates. 



IMPRESSION:  Small cell carcinoma, severe deconditioning, kyphosis, pneumonia,

leukocytosis, and respiratory failure. 



PLAN:  At this stage, continue supportive care.  Neb treatments, Decadron. 



Prognosis remains guarded.  We will follow.







Job ID:  [id number]

## 2020-02-08 NOTE — PDOC.HOSPP
- Subjective


Encounter Date: 02/08/20


Encounter Time: 07:35


Subjective: 





awakens to touch, is on bipap


she had an hours break from bipap and was on high flow yesterday


is seen moving all extre except right LE





- Objective


Vital Signs & Weight: 


 Vital Signs (12 hours)











  Temp Pulse Resp Pulse Ox


 


 02/08/20 08:00     92 L


 


 02/08/20 07:40  97.3 F L   


 


 02/08/20 07:19     96


 


 02/08/20 07:16   120 H  44 H  96


 


 02/08/20 04:00  97.3 F L   


 


 02/08/20 01:27   108 H   94 L


 


 02/08/20 01:25     96


 


 02/07/20 23:31  97.1 F L   








 Weight











Weight                         110 lb 3.2 oz











 Most Recent Monitor Data











Heart Rate from ECG            103


 


NIBP                           107/63


 


NIBP BP-Mean                   77


 


Respiration from ECG           21


 


SpO2                           90














I&O: 


 











 02/07/20 02/08/20 02/09/20





 06:59 06:59 06:59


 


Intake Total 1720 1880 


 


Output Total 1075 1450 


 


Balance 645 430 











Result Diagrams: 


 02/08/20 03:02





 02/08/20 03:02





Hospitalist ROS





- Medication


Medications: 


Active Medications











Generic Name Dose Route Start Last Admin





  Trade Name Freq  PRN Reason Stop Dose Admin


 


Acetaminophen  650 mg  02/01/20 22:48  02/04/20 18:23





  Tylenol  PO   650 mg





  Q4H PRN   Administration





  Headache/Fever/Mild Pain (1-3)   





     





     





     


 


Albuterol/Ipratropium  3 ml  02/02/20 13:00  02/08/20 07:16





  Duoneb  NEB   3 ml





  [id number] TI   Administration





     





     





     





     


 


Dexamethasone  4 mg  02/06/20 21:00  02/08/20 09:05





  Decadron  SLOW IVP   4 mg





  BID TI   Administration





     





     





     





     


 


Famotidine  20 mg  02/02/20 09:00  02/08/20 08:49





  Pepcid  PO   Not Given





  BID TI   





     





     





     





     


 


Furosemide  20 mg  02/08/20 09:00  02/08/20 09:05





  Lasix  SLOW IVP   20 mg





  DAILY TI   Administration





     





     





     





     


 


Levofloxacin 500 mg/ Device  100 mls @ 100 mls/hr  02/02/20 22:00  02/07/20 22:

28





  IVPB   100 mls





  Q24HR TI   Administration





     





     





     





     


 


Metronidazole 500 mg/ Device  100 mls @ 100 mls/hr  02/05/20 20:00  02/08/20 04:

06





  IVPB   100 mls





  0400,1200,2000 TI   Administration





     





     





     





     


 


Sodium Chloride  1,000 mls @ 50 mls/hr  02/07/20 11:15  02/08/20 09:05





  1/2 Normal Saline  IV   1,000 mls





  .Q20H TI   Administration





     





     





     





     


 


Melatonin  6 mg  02/02/20 22:47  02/04/20 21:22





  Melatonin  PO   6 mg





  HSPRN PRN   Administration





  Insomnia   





     





     





     


 


Menthol/Methyl Salicylate  120 gm  02/04/20 20:01  02/04/20 21:13





  Muscle Rub Cream (Bengay)  TOP   120 gm





  PRN PRN   Administration





  Muscle Pain   





     





     





     


 


Nicotine  7 mg  02/03/20 21:00  02/07/20 20:44





  Nicoderm Patch  TD   7 mg





  Q24HR TI   Administration





     





     





     





     


 


Saccharomyces Boulardii  250 mg  02/02/20 09:00  02/08/20 08:49





  Florastor  PO   Not Given





  DAILY TI   





     





     





     





     


 


Sodium Chloride  10 ml  02/01/20 22:48  02/04/20 21:14





  Flush - Normal Saline  IVF   10 ml





  PRN PRN   Administration





  Saline Flush   





     





     





     














- Exam


General Appearance: ill appearing


Eye: PERRL, anicteric sclera


ENT: no oropharyngeal lesions, dry oral mucosa


Neck: supple, no JVD


Heart: RRR, no murmur


Respiratory: no wheezes, rales, rhonchi


Gastrointestinal: soft, non-tender, non-distended, normal bowel sounds


Extremities: no edema


Extremities - other findings: right LE shortened and in ext rotation


Neurological: cranial nerve grossly intact, no focal deficits





Hosp A/P


(1) small cell lung cancer with metastases


Status: Acute   





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


Status: Resolved   





(3) Right femoral fracture


Code(s): S72.91XA - UNSP FRACTURE OF RIGHT FEMUR, INIT FOR CLOS FX   Status: 

Acute   


Qualifiers: 


   Encounter type: subsequent encounter   Femur location: intertrochanteric   

Fracture type: closed   Fracture alignment: displaced 





(4) multiple liver mets


Status: Acute   





(5) Chronic anemia


Code(s): D64.9 - ANEMIA, UNSPECIFIED   Status: Chronic   





(6) Tobacco abuse


Code(s): Z72.0 - TOBACCO USE   Status: Chronic   





(7) Alcohol use disorder, mild, abuse


Code(s): F10.10 - ALCOHOL ABUSE, UNCOMPLICATED   Status: Chronic   





(8) FTT (failure to thrive) in adult


Status: Acute   





- Plan





off tolvaptan for siadh, sodium is normal.


poor prognosis with multiple mets from small cell lung cancer to occipital lobe

, parietal skull, multiple large liver mets, adrenal glands and abd lymph nodes 

as well.


nebs, steroids


low dose fentanyl tts for hip fracture and cancer


d/w  extensively, pt is DNAR, he is aware of poor prognosis (2/2/2020).


Palliative care consultation.


got 1 dose carboplatin 2/4/2020, etoposide and atezolizumab first cycle is 

completed 2/6/2020.


suggest oral feeding between bipap breaks.


has high hco3 but will need 1 to 2 doses of lasix, await pulm opinion.

## 2020-02-08 NOTE — PRG
DATE OF SERVICE:  02/08/2020



SUBJECTIVE:  This is a 69-year-old with metastatic small-cell cancer, seen in

followup for hyponatremia management.  No new problem.  The patient is still BiPAP

dependent.  N.p.o.  Otherwise, she reported no new problem. 



OBJECTIVE:  VITAL SIGNS:  Temperature 97.3, pulse 120, respiratory rate 21, SpO2 92%

on BiPAP with 70% FiO2, blood pressure is 107/63. 

GENERAL:  Chronically ill-looking female, in some respiratory distress.  Afebrile. 

HEENT:  Normocephalic and atraumatic.  BiPAP mask is in place. 

CARDIOVASCULAR:  Regular rhythm and rate, but tachycardic. 

RESPIRATORY:  Decreased air entry both bases with scattered crackles and some

transmitted breath sounds.  Tachypnea noted. 

GI:  Full, soft, nontender, nondistended with normal bowel sounds. 

EXTREMITIES:  Right leg held in lateral rotation.  Edema of upper limbs noted. 

CNS:  Conscious, alert, oriented x3 at least.  Conversational.



DIAGNOSTIC DATA:  CBC showed WBC count of 36, hemoglobin of 9.2, platelet of 113. 



Chemistry showed sodium 136, potassium 4.9, chloride 94, CO2 of 34, BUN 17,

creatinine 0.41, glucose 119, uric acid 5.7, calcium 8.3, phosphorus 3.3.  Total

magnesium 2.2, total bilirubin 0.5, AST 20, ALT 17, alkaline phosphatase 71, total

protein 4.5, albumin 2.7. 



ASSESSMENT:  

1. Hyponatremia, seems to be multifactorial from syndrome of inappropriate

antidiuretic hormone related to small cell lung cancer as well as poor solute

intake.  The patient received tolvaptan low dose x2 with appropriate increase.

Sodium today is 136.  The patient has been stable sodium wise in the last 2 to 3

days. 

2. Acute respiratory failure.  BiPAP dependent due to obstructive pneumonia related

to the cancer. 

3. Protein-calorie malnutrition.

4. Right hip fracture.

5. Hyperkalemia, resolved with Lasix.

6. Metastatic lung cancer:  Treatment as per Oncology.



PLAN:  Agree with gentle IV fluid therapy since patient is n.p.o.  Nutritional

rehabilitation is recommended once it is possible.  Nephrology will sign off at this

time.  Please call for any clarification or questions.  There is no further need for

tolvaptan at this time.  It is also anticipated that with the treatment of small

cell cancer with chemotherapy, sodium level will stabilize going forward. 







Job ID:  [id number]

## 2020-02-09 LAB
ALBUMIN SERPL BCG-MCNC: 2.9 G/DL (ref 3.4–4.8)
ALP SERPL-CCNC: 98 U/L (ref 40–110)
ALT SERPL W P-5'-P-CCNC: 17 U/L (ref 8–55)
ANION GAP SERPL CALC-SCNC: 14 MMOL/L (ref 10–20)
ANION GAP SERPL CALC-SCNC: 18 MMOL/L (ref 10–20)
AST SERPL-CCNC: 23 U/L (ref 5–34)
BILIRUB SERPL-MCNC: 0.6 MG/DL (ref 0.2–1.2)
BUN SERPL-MCNC: 20 MG/DL (ref 9.8–20.1)
BUN SERPL-MCNC: 20 MG/DL (ref 9.8–20.1)
CALCIUM SERPL-MCNC: 8.2 MG/DL (ref 7.8–10.44)
CALCIUM SERPL-MCNC: 8.3 MG/DL (ref 7.8–10.44)
CHLORIDE SERPL-SCNC: 91 MMOL/L (ref 98–107)
CHLORIDE SERPL-SCNC: 92 MMOL/L (ref 98–107)
CO2 SERPL-SCNC: 33 MMOL/L (ref 23–31)
CO2 SERPL-SCNC: 35 MMOL/L (ref 23–31)
CREAT CL PREDICTED SERPL C-G-VRATE: 106 ML/MIN (ref 70–130)
CREAT CL PREDICTED SERPL C-G-VRATE: 98 ML/MIN (ref 70–130)
GLOBULIN SER CALC-MCNC: 1.7 G/DL (ref 2.4–3.5)
GLUCOSE SERPL-MCNC: 109 MG/DL (ref 80–115)
GLUCOSE SERPL-MCNC: 110 MG/DL (ref 80–115)
HGB BLD-MCNC: 10.2 G/DL (ref 12–16)
MCH RBC QN AUTO: 34.4 PG (ref 27–31)
MCV RBC AUTO: 107 FL (ref 78–98)
MDIFF COMPLETE?: YES
OVALOCYTES BLD QL SMEAR: (no result) (100X)
PLATELET # BLD AUTO: 62 THOU/UL (ref 130–400)
POTASSIUM SERPL-SCNC: 4.8 MMOL/L (ref 3.5–5.1)
POTASSIUM SERPL-SCNC: 4.9 MMOL/L (ref 3.5–5.1)
RBC # BLD AUTO: 2.96 MILL/UL (ref 4.2–5.4)
SODIUM SERPL-SCNC: 135 MMOL/L (ref 136–145)
SODIUM SERPL-SCNC: 138 MMOL/L (ref 136–145)
WBC # BLD AUTO: 30 THOU/UL (ref 4.8–10.8)

## 2020-02-09 RX ADMIN — METRONIDAZOLE SCH MLS: 500 INJECTION, SOLUTION INTRAVENOUS at 03:59

## 2020-02-09 RX ADMIN — METRONIDAZOLE SCH MLS: 500 INJECTION, SOLUTION INTRAVENOUS at 19:43

## 2020-02-09 RX ADMIN — METRONIDAZOLE SCH MLS: 500 INJECTION, SOLUTION INTRAVENOUS at 12:57

## 2020-02-09 NOTE — PRG
DATE OF SERVICE:  02/09/2020



SUBJECTIVE:  Sarai Mayorga is a 69-year-old female, who is unable to get her off

her BiPAP even for a brief period of time.  Intake is very poor. 



OBJECTIVE:  VITAL SIGNS:  Temperature is normal.  Pulse 107.  Saturations are on a

BiPAP 85, respirations 20. 

CHEST:  Decreased breath sounds, rhonchi. 

CARDIAC:  Normal S1, S2.  No gallops. 

ABDOMEN:  No masses.



LABORATORY DATA:  White count 30,000, H and H 10 and 31, platelet count is low at

62.  Lytes are normal.  . 



ASSESSMENT:  

1. Respiratory failure.

2. Congestive heart failure.

3. Chronic obstructive pulmonary disease.

4. Severe deconditioning.

5. Thrombocytopenia.

6. Small-cell lung cancer.



PLAN:  Not much to be done at this stage since she is not able to tolerate being off

BiPAP for a brief period of time.  Continue aggressive neb treatments, steroids,

supportive care. 



She is a DNR.  Prognosis is poor.







Job ID:  [id number]

## 2020-02-09 NOTE — PDOC.HOSPP
- Subjective


Encounter Date: 02/09/20


Encounter Time: 11:00


Subjective: 





off bipap and is on high flow spo2 around 88% (has been on it for only 10min)


responds to verbal stimuli





- Objective


Vital Signs & Weight: 


 Vital Signs (12 hours)











  Temp Pulse Pulse Ox


 


 02/09/20 11:36  97.2 F L  


 


 02/09/20 11:06   107 H 


 


 02/09/20 08:31    89 L


 


 02/09/20 08:22   106 H 


 


 02/09/20 08:00    94 L


 


 02/09/20 07:20  96.3 F L  


 


 02/09/20 03:41  97.0 F L  








 Weight











Weight                         111 lb 4 oz











 Most Recent Monitor Data











Heart Rate from ECG            108


 


NIBP                           113/76


 


NIBP BP-Mean                   88


 


Respiration from ECG           32


 


SpO2                           93














I&O: 


 











 02/08/20 02/09/20 02/10/20





 06:59 06:59 06:59


 


Intake Total 1880 1450 


 


Output Total 1450 1400 


 


Balance 430 50 











Result Diagrams: 


 02/09/20 07:50





 02/09/20 11:29





Hospitalist ROS





- Medication


Medications: 


Active Medications











Generic Name Dose Route Start Last Admin





  Trade Name Freq  PRN Reason Stop Dose Admin


 


Acetaminophen  650 mg  02/01/20 22:48  02/08/20 20:39





  Tylenol  PO   650 mg





  Q4H PRN   Administration





  Headache/Fever/Mild Pain (1-3)   





     





     





     


 


Albuterol/Ipratropium  3 ml  02/02/20 13:00  02/09/20 08:20





  Duoneb  NEB   3 ml





  [id number] TI   Administration





     





     





     





     


 


Dexamethasone  4 mg  02/06/20 21:00  02/09/20 09:47





  Decadron  SLOW IVP   4 mg





  BID TI   Administration





     





     





     





     


 


Famotidine  20 mg  02/02/20 09:00  02/09/20 09:47





  Pepcid  PO   Not Given





  BID TI   





     





     





     





     


 


Furosemide  20 mg  02/08/20 09:00  02/09/20 09:47





  Lasix  SLOW IVP   20 mg





  DAILY TI   Administration





     





     





     





     


 


Levofloxacin 500 mg/ Device  100 mls @ 100 mls/hr  02/02/20 22:00  02/08/20 21:

34





  IVPB   100 mls





  Q24HR TI   Administration





     





     





     





     


 


Metronidazole 500 mg/ Device  100 mls @ 100 mls/hr  02/05/20 20:00  02/09/20 03:

59





  IVPB   100 mls





  0400,1200,2000 TI   Administration





     





     





     





     


 


Sodium Chloride  1,000 mls @ 50 mls/hr  02/07/20 11:15  02/09/20 03:05





  1/2 Normal Saline  IV   Not Given





  .Q20H TI   





     





     





     





     


 


Melatonin  6 mg  02/02/20 22:47  02/08/20 20:39





  Melatonin  PO   6 mg





  HSPRN PRN   Administration





  Insomnia   





     





     





     


 


Menthol/Methyl Salicylate  120 gm  02/04/20 20:01  02/04/20 21:13





  Muscle Rub Cream (Bengay)  TOP   120 gm





  PRN PRN   Administration





  Muscle Pain   





     





     





     


 


Nicotine  7 mg  02/03/20 21:00  02/08/20 20:11





  Nicoderm Patch  TD   7 mg





  Q24HR TI   Administration





     





     





     





     


 


Saccharomyces Boulardii  250 mg  02/02/20 09:00  02/09/20 09:47





  Florastor  PO   Not Given





  DAILY TI   





     





     





     





     


 


Sodium Chloride  10 ml  02/01/20 22:48  02/04/20 21:14





  Flush - Normal Saline  IVF   10 ml





  PRN PRN   Administration





  Saline Flush   





     





     





     














- Exam


General Appearance: awake alert


Eye: PERRL, anicteric sclera


ENT: no oropharyngeal lesions, moist mucosa


Neck: supple, no JVD


Heart: RRR, no gallops


Respiratory: no wheezes, no rales, rhonchi


Gastrointestinal: soft, non-tender, non-distended, normal bowel sounds


Extremities - other findings: right LE is externally rotated and shortened


Neurological: cranial nerve grossly intact, no focal deficits





Hosp A/P


(1) small cell lung cancer with metastases


Status: Acute   





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


Status: Resolved   





(3) Right femoral fracture


Code(s): S72.91XA - UNSP FRACTURE OF RIGHT FEMUR, INIT FOR CLOS FX   Status: 

Acute   


Qualifiers: 


   Encounter type: subsequent encounter   Femur location: intertrochanteric   

Fracture type: closed   Fracture alignment: displaced 





(4) multiple liver mets


Status: Acute   





(5) Chronic anemia


Code(s): D64.9 - ANEMIA, UNSPECIFIED   Status: Chronic   





(6) Tobacco abuse


Code(s): Z72.0 - TOBACCO USE   Status: Chronic   





(7) Alcohol use disorder, mild, abuse


Code(s): F10.10 - ALCOHOL ABUSE, UNCOMPLICATED   Status: Chronic   





(8) FTT (failure to thrive) in adult


Status: Acute   





- Plan





off tolvaptan for siadh, sodium is normal.


poor prognosis with multiple mets from small cell lung cancer to occipital lobe

, parietal skull, multiple large liver mets, adrenal glands and abd lymph nodes 

as well.


nebs, steroids, lasix, levaquin and flagyl


low dose fentanyl tts for hip fracture and cancer


d/w  extensively, pt is DNAR, he is aware of poor prognosis (2/2/2020).


Palliative care is following.


got 1 dose carboplatin 2/4/2020, etoposide and atezolizumab first cycle is 

completed 2/6/2020.


suggest oral feeding between bipap breaks or start PPN (day 8 of npo).

## 2020-02-10 LAB
ANION GAP SERPL CALC-SCNC: (no result) MMOL/L (ref 10–20)
BUN SERPL-MCNC: 16 MG/DL (ref 9.8–20.1)
CALCIUM SERPL-MCNC: 8.2 MG/DL (ref 7.8–10.44)
CHLORIDE SERPL-SCNC: 91 MMOL/L (ref 98–107)
CO2 SERPL-SCNC: 37 MMOL/L (ref 23–31)
CREAT CL PREDICTED SERPL C-G-VRATE: 106 ML/MIN (ref 70–130)
GLUCOSE SERPL-MCNC: 128 MG/DL (ref 80–115)
HGB BLD-MCNC: 9.2 G/DL (ref 12–16)
MACROCYTES BLD QL SMEAR: (no result) (100X)
MCH RBC QN AUTO: 34.1 PG (ref 27–31)
MCV RBC AUTO: 107 FL (ref 78–98)
MDIFF COMPLETE?: YES
PLATELET # BLD AUTO: 39 THOU/UL (ref 130–400)
POTASSIUM SERPL-SCNC: 4.9 MMOL/L (ref 3.5–5.1)
RBC # BLD AUTO: 2.7 MILL/UL (ref 4.2–5.4)
SODIUM SERPL-SCNC: 136 MMOL/L (ref 136–145)
WBC # BLD AUTO: 10.7 THOU/UL (ref 4.8–10.8)

## 2020-02-10 RX ADMIN — METRONIDAZOLE SCH MLS: 500 INJECTION, SOLUTION INTRAVENOUS at 12:12

## 2020-02-10 RX ADMIN — METRONIDAZOLE SCH MLS: 500 INJECTION, SOLUTION INTRAVENOUS at 20:07

## 2020-02-10 RX ADMIN — METRONIDAZOLE SCH MLS: 500 INJECTION, SOLUTION INTRAVENOUS at 03:45

## 2020-02-10 RX ADMIN — Medication PRN ML: at 23:59

## 2020-02-10 NOTE — RAD
EXAM: 

CHEST ONE VIEW



HISTORY:

Respiratory failure. Follow-up evaluation.



COMPARISON:

2/7/2020



FINDINGS:

The right paramediastinal and right suprahilar mass is again seen. Small right pleural effusion is id
entified. Mild chronic lung changes are seen. Linear and minimal patchy density right midlung zone

is seen. Cardiac silhouette and pulmonary vasculature are within normal limits. Osteopenia is present
 with remote fracture deformity involving the proximal left humerus.



IMPRESSION:



1. Right paramediastinal and right suprahilar masslike density again present.

2. Small right pleural effusion and associated atelectasis.

3. Mild chronic lung changes with minimal linear and slight patchy densities right midlung zone which
 could be related to postobstructive pneumonitis or volume loss.



Reported By: Jason Miller 

Electronically Signed:  2/10/2020 10:13 AM

## 2020-02-10 NOTE — PDOC.HOSPP
- Subjective


Encounter Date: 02/10/20


Subjective: 


The patient remains BiPAP dependent.


She is tachypneic and tachycardic.


Unable to take the BiPAP enough to tolerate any meals.








- Objective


Vital Signs & Weight: 


 Vital Signs (12 hours)











  Temp Pulse Resp Pulse Ox


 


 02/10/20 15:32  97.0 F L   


 


 02/10/20 13:05     100


 


 02/10/20 13:04   114 H  36 H  100


 


 02/10/20 11:18  96.8 F L   


 


 02/10/20 10:58   118 H  


 


 02/10/20 08:29   107 H  


 


 02/10/20 08:28   111 H  41 H  96


 


 02/10/20 08:00     95


 


 02/10/20 07:14  96.8 F L   








 Weight











Weight                         111 lb











 Most Recent Monitor Data











Heart Rate from ECG            108


 


NIBP                           127/83


 


NIBP BP-Mean                   97


 


Respiration from ECG           31


 


SpO2                           99














I&O: 


 











 02/09/20 02/10/20 02/11/20





 06:59 06:59 06:59


 


Intake Total 1450 671 


 


Output Total 1400 950 800


 


Balance 50 -279 -800











Result Diagrams: 


 02/10/20 03:52





 02/10/20 03:52





Hospitalist ROS





- Medication


Medications: 


Active Medications











Generic Name Dose Route Start Last Admin





  Trade Name Freq  PRN Reason Stop Dose Admin


 


Acetaminophen  650 mg  02/01/20 22:48  02/09/20 19:42





  Tylenol  PO   650 mg





  Q4H PRN   Administration





  Headache/Fever/Mild Pain (1-3)   





     





     





     


 


Albuterol/Ipratropium  3 ml  02/02/20 13:00  02/10/20 13:04





  Duoneb  NEB   3 ml





  [id number] TI   Administration





     





     





     





     


 


Famotidine  20 mg  02/02/20 09:00  02/10/20 09:06





  Pepcid  PO   20 mg





  BID TI   Administration





     





     





     





     


 


Levofloxacin 500 mg/ Device  100 mls @ 100 mls/hr  02/02/20 22:00  02/09/20 21:

01





  IVPB   100 mls





  Q24HR TI   Administration





     





     





     





     


 


Metronidazole 500 mg/ Device  100 mls @ 100 mls/hr  02/05/20 20:00  02/10/20 12:

12





  IVPB   100 mls





  0400,1200,2000 TI   Administration





     





     





     





     


 


Sodium Chloride  1,000 mls @ 75 mls/hr  02/10/20 13:20  02/10/20 13:28





  1/2 Normal Saline  IV   1,000 mls





  .K15H55O TI   Administration





     





     





     





     


 


Latanoprost  1 drop  02/09/20 21:00  02/09/20 20:34





  Xalatan 0.005% Ophth Soln  EA EYE   1 drop





  HS TI   Administration





     





     





     





     


 


Melatonin  6 mg  02/02/20 22:47  02/09/20 20:33





  Melatonin  PO   6 mg





  HSPRN PRN   Administration





  Insomnia   





     





     





     


 


Menthol/Methyl Salicylate  120 gm  02/04/20 20:01  02/04/20 21:13





  Muscle Rub Cream (Bengay)  TOP   120 gm





  PRN PRN   Administration





  Muscle Pain   





     





     





     


 


Nicotine  7 mg  02/03/20 21:00  02/09/20 20:34





  Nicoderm Patch  TD   7 mg





  Q24HR TI   Administration





     





     





     





     


 


Saccharomyces Boulardii  250 mg  02/02/20 09:00  02/10/20 09:06





  Florastor  PO   250 mg





  DAILY TI   Administration





     





     





     





     


 


Sodium Chloride  10 ml  02/01/20 22:48  02/04/20 21:14





  Flush - Normal Saline  IVF   10 ml





  PRN PRN   Administration





  Saline Flush   





     





     





     














- Exam


General Appearance: awake alert


Eye: PERRL


ENT: normocephalic atraumatic


Heart: RRR (Tachycardic), no murmur, no gallops


Respiratory: no wheezes, tachypneic


Gastrointestinal: soft, non-tender


Neurological: no focal deficits





Hosp A/P





- Plan





1) small cell lung cancer with metastases


Status: Acute   





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


Status: Resolved   





(3) Right femoral fracture


Code(s): S72.91XA - UNSP FRACTURE OF RIGHT FEMUR, INIT FOR CLOS FX   Status: 

Acute   


Qualifiers: 


   Encounter type: subsequent encounter   Femur location: intertrochanteric   

Fracture type: closed   Fracture alignment: displaced 





(4) multiple liver mets


Status: Acute   





(5) Chronic anemia


Code(s): D64.9 - ANEMIA, UNSPECIFIED   Status: Chronic   





(6) Tobacco abuse


Code(s): Z72.0 - TOBACCO USE   Status: Chronic   





(7) Alcohol use disorder, mild, abuse


Code(s): F10.10 - ALCOHOL ABUSE, UNCOMPLICATED   Status: Chronic   





(8) FTT (failure to thrive) in adult


Status: Acute   





- Plan





2/9:


off tolvaptan for siadh, sodium is normal.


poor prognosis with multiple mets from small cell lung cancer to occipital lobe

, parietal skull, multiple large liver mets, adrenal glands and abd lymph nodes 

as well.


nebs, steroids, lasix, levaquin and flagyl


low dose fentanyl tts for hip fracture and cancer


d/w  extensively, pt is DNAR, he is aware of poor prognosis (2/2/2020).


Palliative care is following.


got 1 dose carboplatin 2/4/2020, etoposide and atezolizumab first cycle is 

completed 2/6/2020.


suggest oral feeding between bipap breaks or start PPN (day 8 of npo).





2/10:


The patient remains and a state of acute on chronic respiratory failure BiPAP 

dependent.


Pulmonology critical care are managing her respiratory status issues.


She remains on IV antibiotics for possible aspiration pneumonia.


Lasix was held by pulmonology.

## 2020-02-10 NOTE — PRG
DATE OF SERVICE:  02/10/2020



SERVICE:  Pulmonary Medicine.



INTERVAL HISTORY:  The patient is doing great from respiratory standpoint.

Breathing comfortably.  No complaints of chest discomfort, fevers, or chills.

Otherwise, there has been no interval change to her condition.  Her white blood cell

count has dramatically improved.  Her oxygen requirements are slowly getting better.

 At this point, she has been weaned down to 55% FiO2 delivered via BiPAP.  She is

anxious.  Whenever we give her break off the BiPAP, but she understands that it is

time for us to continue making efforts at weaning her. 



OBJECTIVE:  VITAL SIGNS:  Afebrile, pulse 114, blood pressure 114/89, respirations

31, saturation 100%, currently on high-flow nasal cannula delivering 80% FiO2. 

HEENT:  Normocephalic and atraumatic.  Sclerae white.  Conjunctivae pink.  Oral

mucosa is moist without lesions. 

LUNGS:  Decent air entry.  No prolonged expiratory phase or wheezing is appreciated. 

HEART:  Normal rate.  Regular. 

ABDOMEN:  Soft, nontender, nondistended.  Bowel sounds are positive. 

MUSCULOSKELETAL:  No cyanosis or clubbing.  There is no pitting in the bilateral

lower extremities. 

NEUROLOGIC:  Grossly nonfocal.



LABORATORY DATA:  WBC 10.7, hemoglobin 9.2, platelets 39,000 and significantly

downtrending.  INR 1.2.  PH 7.37, pCO2 of 55, pO2 of 45, bicarb 37.  Basic metabolic

profile is otherwise unremarkable.  Creatinine remains low, as is the BUN.

Phosphorus is 4.1 and uric acid level was previously 5.7.  Urinalysis is

unremarkable.  Blood cultures x2, influenza A and B, and urine culture are negative. 



IMAGING:  Chest x-ray demonstrates mass-like density and has once again

redemonstrated.  Small right-sided pleural effusion with associated atelectasis is

noted.  Chronic lung changes are present.  Postobstructive pneumonia cannot be

excluded. 



ASSESSMENT:  

1. Acute hypoxic respiratory failure.

2. Small-cell lung cancer, widely metastatic.

3. Syndrome of inappropriate antidiuretic hormone secretion.

4. Mechanical fall secondary to medical issues resulting in right-sided

intertrochanteric hip fracture (pathologic fracture not excluded). 



DISCUSSION AND PLAN:  Because of her anxiety, I will give her a very low dose of

Klonopin.  This will be scheduled twice daily, but will hold if she has high

sedation.  I will increase her IV fluid rates as she has approached euvolemia at

this point.  Pulmonary/Critical Care will continue to follow closely during the

hospital stay. 







Job ID:  [id number]

## 2020-02-10 NOTE — PRG
DATE OF SERVICE:  02/10/2020



Ms. Mayorga today is examined in her bed in the [facility].  Her daughter is at bedside.

Since I last saw Ms. Mayorga, she has continued to have significant respiratory

distress, although is not on a ventilator.  The patient does have an

intertrochanteric femur fracture, which has not been addressed surgically out of

concern regarding her pulmonary status.  Today, I had the pleasure of reviewing all

these findings with the patient and her daughter.  At this time, they still wish to

not proceed with surgery out of concerns regarding her pulmonary status.  They will

continue to give surgical intervention consideration and review these options with

the primary medical team as well.  We did have a discussion that one possibility is

to proceed with surgery under a spinal anesthetic, if it was felt that the rest of

her systems were strong enough to undergo any type of surgical intervention.  At

this time, we will await any change in family's desire with respect to surgical

intervention and just recommend continued pain management and minimizing movement of

this right lower extremity. 







Job ID:  [id number]

## 2020-02-11 VITALS — TEMPERATURE: 97 F

## 2020-02-11 RX ADMIN — METRONIDAZOLE SCH MLS: 500 INJECTION, SOLUTION INTRAVENOUS at 04:00

## 2020-02-11 NOTE — PDOC.MOPN
Interval History: 





C1D8 of chemo: Sleeping comfortably. Awakes to sound and touch. Still c/o 

feeling SOB and anxious. Slept throughout the night w/o any pain or pther 

problems. Currently SpO2 at 100% on 80% FiO2 on BiPAP.





- Vital Signs


Vital Signs: 


 Vital Signs (12 hours)











  Temp Pulse Resp Pulse Ox


 


 02/11/20 07:56   106 H  


 


 02/11/20 07:45   105 H  24 H  99


 


 02/11/20 04:00  97.2 F L   


 


 02/10/20 23:42   111 H  


 


 02/10/20 23:30  97.2 F L   








 Weight











Weight                         110 lb 4.8 oz











 Most Recent Monitor Data











Heart Rate from ECG            101


 


NIBP                           92/61


 


NIBP BP-Mean                   71


 


Respiration from ECG           20


 


SpO2                           97

















- Physical Exam


General: Alert, Mild distress


HEENT: EOMI, Other (on BiPAP)


Lungs: Other (anterior breath sounds clear)


Cardiovascular: Regular rate


Abdomen: Soft, No tenderness





- Labs


Result Diagrams: 


 02/10/20 03:52





 02/10/20 03:52





A/P





- Problem


(1) Metastatic cancer


Current Visit: Yes   Code(s): C79.9 - SECONDARY MALIGNANT NEOPLASM OF 

UNSPECIFIED SITE   Status: Deleted   





(2) Right femoral fracture


Current Visit: Yes   Code(s): S72.91XA - UNSP FRACTURE OF RIGHT FEMUR, INIT FOR 

CLOS FX   Status: Acute   


Qualifiers: 


   Encounter type: subsequent encounter   Femur location: intertrochanteric   

Fracture type: closed   Fracture alignment: displaced 





(3) SIADH (syndrome of inappropriate ADH production)


Current Visit: Yes   Status: Resolved   





- Plan


Plan: 





** C1D8 of Carboplatin + VP16, Days 1-3, followed by Fulphila on Day 4. Could 

not receive Tecentriq in the hospital so will hopefully add with C2


   ~~ Daily CBC, CMP


** trend sodium, improved with Tolvaptan and chemo


** Monitor brain lesions for now given asymptomatic and systemic disease more 

of a risk at this time


** DVT PPx: Plts 39, need to avoid DVT prophylaxis until platelets > 50 - she 

is high risk with hip fracture and diffuse metastatic cancer

## 2020-02-11 NOTE — PRG
DATE OF SERVICE:  02/11/2020



SERVICE:  Pulmonary Medicine.



INTERVAL HISTORY:  The patient is doing really well from respiratory standpoint.

Breathing comfortably.  No complaints of chest discomfort, nausea, or vomiting.  She

was able to get some sleep overnight with the help of Klonopin.  It seems to wear

off midway through the day.  Otherwise, there has been no interval change to her

condition.  Her oxygen requirements seem to be improving a little bit.  Yesterday,

she was able to tolerate a 6-hour break from BiPAP on high-flow nasal cannula. 



PHYSICAL EXAMINATION:

VITAL SIGNS:  Afebrile, pulse 98, blood pressure 92/61, respirations 18, and

saturation 99%, currently on BiPAP with 70% FiO2. 

GENERAL:  The patient is awake and alert, in no apparent distress. 

LUNGS:  Decent air entry.  No prolonged expiratory phase or wheezing is appreciated. 

HEART:  Normal rate.  Regular. 

ABDOMEN:  Soft, nontender, and nondistended.  Bowel sounds are positive. 

MUSCULOSKELETAL:  No cyanosis or clubbing.  There is trace pitting at the left hip. 

NEUROLOGIC:  Grossly nonfocal.



ASSESSMENT:  

1. Acute hypoxic respiratory failure.

2. Small cell lung cancer, widely metastatic.

3. Syndrome of inappropriate ADH secretion.

4. Mechanical fall resulting in right-sided intertrochanteric hip fracture

(pathologic fracture not excluded). 



DISCUSSION AND PLAN:  Her oxygen requirements are finally improving.  We will

continue giving her breaks off the BiPAP to high-flow nasal cannula three times

daily and increase as tolerated.  I will repeat some basic laboratories tomorrow

morning.  She will remain in the IMCU for the time being.  Hopefully, we will be

able to decrease her oxygen requirements significantly. 





Job ID:  [id number]

## 2020-02-12 NOTE — DIS
DATE OF ADMISSION:  02/01/2020



DATE OF DISCHARGE:  02/11/2020



HISTORY OF PRESENT ILLNESS AND HOSPITAL COURSE:  The patient is a 69-year-old 
white

female with past medical history of glaucoma and tobacco abuse, who presents to 
the

hospital after a fall sustained at home.  In the emergency department, a CT 
scan of

the head demonstrated a possible metastatic process.  Subsequent CT scans 
revealed a

lung mass with metastasis to the liver and the brain.  The patient was admitted 
to

the hospital and her respiratory status subsequently deteriorated within 24 
hours.

The patient was placed on oxygen and subsequently escalated to Ventimask and

eventually BiPAP.  The patient was managed for pulmonary edema and possible

aspiration pneumonia with antibiotics and diuretics, but despite that and 
despite

our best efforts, the patient essentially succumbed to her illness and she 
passed

away on 02/11, at 1357. 



DISCHARGE DIAGNOSES:  

1. Small cell lung cancer with metastasis.

2. Syndrome of inappropriate antidiuretic hormone secretion.

3. Right femur fracture.

4. Multiple liver metastasis.

5. Chronic anemia.

6. Tobacco abuse.

7. Failure to thrive.







Job ID:  [id number]



[facility]

## 2020-02-13 NOTE — PQF
JOSEPHINE WATERS JOSEPH

[id number]                                                             [facility]-
B12

[id number]                             

                                   

CLINICAL DOCUMENTATION CLARIFICATION FORM:  POST DISCHARGE



Addendum to original discharge summary date:  __________________________________
____



Late entry note date:  _________________________________________________________
__











DATE: 02/13/2020                                                   ATTN: LOLLY BAINS





Please exercise your independent, professional judgment in responding to the 
clarification form. 

Clinical indicators are provided on the bottom of this form for your review





Please check appropriate box(s) to clarify if the following diagnosis has been 
ruled in or ruled out:  Sepsis



[  x] Ruled in diagnosis

     [ x ] Continue to treat        [  ] Resolved

[  ] Ruled out diagnosis

[  ] Cannot rule out diagnosis

[  ] Other diagnosis ___________

[  ] Unable to determine



For continuity of documentation, please document condition throughout progress 
notes and discharge summary.  Thank You.



CLINICAL INDICATORS - SIGNS / SYMPTOMS / LABS

- Sepsis due to urinary tract infection- H&P, 02/01, Epifanio Wesley MD

- Temp:98.1, RR:22, Pulse:99- H&P, 02/01, Epifanio Wesley MD

- WBC: 16.3H on 02/03, 21.8H on 02/07, 36.0H on 02/08- Laboratory report



RISK FACTORS

- Acute hypoxic respiratory failure- Progress note, 02/11,  LOLLY BAINS

- Small cell lung cancer- Progress note, 02/11,  LOLLY BAINS



TREATMENTS

- Empiric antibiotics - H&P, 02/01, Epifanio Wesley MD

- Levofloxacin.IV- MAR, 02/01



(This form is maintained as a part of the permanent medical record)

2015 [employer]  All Rights Reserved

[email or web address]    [phone]

                                                              



KAL

## 2020-03-10 NOTE — PQF
JOSEPHINE WATERS MOEZ

[id number]                                                             [facility]-
B12

[id number]                             

                                   

CLINICAL DOCUMENTATION CLARIFICATION FORM:  POST DISCHARGE



Addendum to original discharge summary date:  __________________________________
____



Late entry note date:  _________________________________________________________
__









Date: 03/10/2020                                                                
ATTN:  RICHELLE GALICIA



Please exercise your independent, professional judgment in responding to the 
clarification form. 

Clinical indicators are provided on the bottom of this form for your review



Please check appropriate box(s):

[  ] Protein Calorie Malnutrition:

           [  ] Mild      [  >] Moderate   [  ] Severe   

[ > ] Protein Calorie Malnutrition unspecified

[  ] Other diagnosis ___________

[  ] Unable to determine



CLINICAL INDICATORS - SIGNS / SYMPTOMS / LABS

- Protein Calorie Malnutrition- Progress note, 02/08, Ted Schuler MD

- BMI 21.5 - FNS assessment 02/11

- FTT in adult, status: acute-   Hospital PN, 02/10,  RICHELLE GALICIA

- Albumin: 2.9L- FNS assessment 02/011

- Total protein: 5.6L on 02/01, 4.5L on 02/08- Laboratory 



RISK FACTORS

- Small cell lung cancer with metastasis-   Hospital PN, 02/10GRAYSON MOEZ

- Alcohol use disorder, mild abuse- Hospital PN, 02/10GRAYSON MOEZ 



TREATMENT:

- Ensure Enlive TID- FNS assessment 02/11

- Albumin.IV- MAR, 02/01





Moderate Malnutrition (in acute illness)

Energy Intake: <75% of estimated energy requirement for > 7 days

Weight Loss:  1-2%/1 week;  5%/ 1 month; 7.5%/3 months

Other: mild body fat loss; mild muscle mass loss; mild fluid accumulation; 

Severe Malnutrition (in acute illness)

Energy Intake: < 50% of estimated energy requirement for > 5 days

Weight Loss: >1-2%/1 week; >5%/1 month; >7.5%/3 months

Other: moderate body fat loss; moderate muscle mass loss; moderate- severe 
fluid accumulation; measurably reduced  strength

Moderate Malnutrition (in chronic illness)

Energy Intake: <75% of estimated energy requirement for >1 month

[employer] Reports Winform ViewerWeight Loss: 5%/1 month; 7.5
%/3 months; 10%/6 months; 20%/1 year

Other: mild body fat loss; mild muscle mass loss; mild fluid accumulation

Severe Malnutrition (in chronic illness)

Energy Intake: <75% of estimated energy requirement for >1 month

Weight Loss: >5%/1 month; >7.5%/3 months; >10%/6 months; >20%/1 year

Other: severe body fat loss; severe muscle mass loss; severe fluid accumulation
; measurably reduced  strength











(This form is maintained as a part of the permanent medical record)

2015 [employer]  All Rights Reserved

Hannah [email or web address]    [phone]

                                                              



 

KAL

## 2020-03-10 NOTE — PQF
JOSEPHINE WATERS MOEZ

[id number]                                                             [place] [address]

[id number]                             

                                   

CLINICAL DOCUMENTATION CLARIFICATION FORM:  POST DISCHARGE



Addendum to original discharge summary date:  __________________________________
____



Late entry note date:  _________________________________________________________
__







DATE:03/10/2020                                        ATTN: RICHELLE GALICIA



Please exercise your independent, professional judgment in responding to the 
clarification form. 

Clinical indicators are provided on the bottom of this form for your review



Please check appropriate box(s):

HEART FAILURE:



A.  TYPE:

             [  >] Systolic / HFrEF      [  ] Diastolic / HFpEF       [  ] 
Combined Systolic / Diastolic

B.  ACUITY

             [  ] Acute          [>  ] Acute on Chronic          [  ] Chronic

[  ] Other diagnosis ___________

[  ] Unable to determine



For continuity of documentation, please document condition throughout progress 
notes and discharge summary.  Thank You.



CLINICAL INDICATORS - SIGNS / SYMPTOMS / LABS

- Congestive heart failure- Progress note, 02/09, Violet Mejia MD

- BNP: 456.4H- Laboratory report, 02/09

- Possible left atrial enlargement-Electrocardiogram, 02/01

- Prolonged QT-Electrocardiogram, 02/01

- Chronic bilateral loge edema- Progress note, 02/03, Ted Schuler MD



RISKS:

- Acute on chronic respiratory failure- Progress note, 02/03, Ted Schuler MD



TREATMENTS:

- Furosemide.IV- MAR, 03/01

- Electrocardiogram, 02/01







(This form is maintained as a part of the permanent medical record)

2015 [employer]  All Rights Reserved

Hannah [email or web address]    [phone]



KAL